# Patient Record
Sex: FEMALE | Race: WHITE | NOT HISPANIC OR LATINO | URBAN - METROPOLITAN AREA
[De-identification: names, ages, dates, MRNs, and addresses within clinical notes are randomized per-mention and may not be internally consistent; named-entity substitution may affect disease eponyms.]

---

## 2017-04-02 ENCOUNTER — COMMUNICATION - HEALTHEAST (OUTPATIENT)
Dept: FAMILY MEDICINE | Facility: CLINIC | Age: 42
End: 2017-04-02

## 2017-04-05 ENCOUNTER — COMMUNICATION - HEALTHEAST (OUTPATIENT)
Dept: FAMILY MEDICINE | Facility: CLINIC | Age: 42
End: 2017-04-05

## 2017-04-20 ENCOUNTER — COMMUNICATION - HEALTHEAST (OUTPATIENT)
Dept: TELEHEALTH | Facility: CLINIC | Age: 42
End: 2017-04-20

## 2017-09-13 ENCOUNTER — OFFICE VISIT - HEALTHEAST (OUTPATIENT)
Dept: FAMILY MEDICINE | Facility: CLINIC | Age: 42
End: 2017-09-13

## 2017-09-13 DIAGNOSIS — F33.9 MAJOR DEPRESSIVE DISORDER, RECURRENT EPISODE (H): ICD-10-CM

## 2017-09-13 DIAGNOSIS — M51.379 DEGENERATION OF LUMBAR OR LUMBOSACRAL INTERVERTEBRAL DISC: ICD-10-CM

## 2017-09-13 DIAGNOSIS — Z23 IMMUNIZATION DUE: ICD-10-CM

## 2018-03-08 ENCOUNTER — TELEPHONE (OUTPATIENT)
Dept: OTHER | Facility: CLINIC | Age: 43
End: 2018-03-08

## 2018-03-08 NOTE — TELEPHONE ENCOUNTER
3/8/2018    Call Regarding Onboarding [employer] [facility]     Attempt 1    Message on voicemail     Comments:       Outreach   SB

## 2018-08-30 ENCOUNTER — COMMUNICATION - HEALTHEAST (OUTPATIENT)
Dept: FAMILY MEDICINE | Facility: CLINIC | Age: 43
End: 2018-08-30

## 2018-08-30 DIAGNOSIS — F41.9 ANXIETY: ICD-10-CM

## 2018-10-19 ENCOUNTER — RECORDS - HEALTHEAST (OUTPATIENT)
Dept: ADMINISTRATIVE | Facility: OTHER | Age: 43
End: 2018-10-19

## 2018-10-19 LAB — PAP SMEAR - HIM PATIENT REPORTED: NORMAL

## 2018-11-12 ENCOUNTER — OFFICE VISIT - HEALTHEAST (OUTPATIENT)
Dept: FAMILY MEDICINE | Facility: CLINIC | Age: 43
End: 2018-11-12

## 2018-11-12 DIAGNOSIS — F33.0 MILD EPISODE OF RECURRENT MAJOR DEPRESSIVE DISORDER (H): ICD-10-CM

## 2018-11-12 DIAGNOSIS — M79.651 PAIN OF RIGHT THIGH: ICD-10-CM

## 2018-11-12 DIAGNOSIS — F41.9 ANXIETY: ICD-10-CM

## 2018-11-19 ENCOUNTER — HOSPITAL ENCOUNTER (OUTPATIENT)
Dept: MRI IMAGING | Facility: CLINIC | Age: 43
Discharge: HOME | End: 2018-11-19
Attending: FAMILY MEDICINE

## 2018-11-19 ENCOUNTER — COMMUNICATION - HEALTHEAST (OUTPATIENT)
Dept: FAMILY MEDICINE | Facility: CLINIC | Age: 43
End: 2018-11-19

## 2018-11-19 DIAGNOSIS — M79.651 PAIN OF RIGHT THIGH: ICD-10-CM

## 2019-02-11 ENCOUNTER — COMMUNICATION - HEALTHEAST (OUTPATIENT)
Dept: FAMILY MEDICINE | Facility: CLINIC | Age: 44
End: 2019-02-11

## 2019-02-12 ENCOUNTER — OFFICE VISIT - HEALTHEAST (OUTPATIENT)
Dept: FAMILY MEDICINE | Facility: CLINIC | Age: 44
End: 2019-02-12

## 2019-02-12 ENCOUNTER — COMMUNICATION - HEALTHEAST (OUTPATIENT)
Dept: FAMILY MEDICINE | Facility: CLINIC | Age: 44
End: 2019-02-12

## 2019-02-12 DIAGNOSIS — Z01.818 PREOPERATIVE EXAMINATION: ICD-10-CM

## 2019-02-12 LAB
ALBUMIN SERPL-MCNC: 3.9 G/DL (ref 3.5–5)
ALP SERPL-CCNC: 56 U/L (ref 45–120)
ALT SERPL W P-5'-P-CCNC: <9 U/L (ref 0–45)
ANION GAP SERPL CALCULATED.3IONS-SCNC: 7 MMOL/L (ref 5–18)
AST SERPL W P-5'-P-CCNC: 11 U/L (ref 0–40)
BASOPHILS # BLD AUTO: 0 THOU/UL (ref 0–0.2)
BASOPHILS NFR BLD AUTO: 0 % (ref 0–2)
BILIRUB SERPL-MCNC: 1.7 MG/DL (ref 0–1)
BUN SERPL-MCNC: 9 MG/DL (ref 8–22)
CALCIUM SERPL-MCNC: 9.1 MG/DL (ref 8.5–10.5)
CHLORIDE BLD-SCNC: 108 MMOL/L (ref 98–107)
CO2 SERPL-SCNC: 25 MMOL/L (ref 22–31)
CREAT SERPL-MCNC: 0.71 MG/DL (ref 0.6–1.1)
EOSINOPHIL # BLD AUTO: 0.1 THOU/UL (ref 0–0.4)
EOSINOPHIL NFR BLD AUTO: 1 % (ref 0–6)
ERYTHROCYTE [DISTWIDTH] IN BLOOD BY AUTOMATED COUNT: 10.6 % (ref 11–14.5)
GFR SERPL CREATININE-BSD FRML MDRD: >60 ML/MIN/1.73M2
GLUCOSE BLD-MCNC: 93 MG/DL (ref 70–125)
HCG UR QL: NEGATIVE
HCT VFR BLD AUTO: 38.7 % (ref 35–47)
HGB BLD-MCNC: 13.3 G/DL (ref 12–16)
LYMPHOCYTES # BLD AUTO: 1 THOU/UL (ref 0.8–4.4)
LYMPHOCYTES NFR BLD AUTO: 14 % (ref 20–40)
MCH RBC QN AUTO: 32.7 PG (ref 27–34)
MCHC RBC AUTO-ENTMCNC: 34.5 G/DL (ref 32–36)
MCV RBC AUTO: 95 FL (ref 80–100)
MONOCYTES # BLD AUTO: 0.4 THOU/UL (ref 0–0.9)
MONOCYTES NFR BLD AUTO: 6 % (ref 2–10)
NEUTROPHILS # BLD AUTO: 5.8 THOU/UL (ref 2–7.7)
NEUTROPHILS NFR BLD AUTO: 79 % (ref 50–70)
PLATELET # BLD AUTO: 235 THOU/UL (ref 140–440)
PMV BLD AUTO: 7.2 FL (ref 7–10)
POTASSIUM BLD-SCNC: 3.9 MMOL/L (ref 3.5–5)
PROT SERPL-MCNC: 6.8 G/DL (ref 6–8)
RBC # BLD AUTO: 4.07 MILL/UL (ref 3.8–5.4)
SODIUM SERPL-SCNC: 140 MMOL/L (ref 136–145)
SP GR UR STRIP: 1.02 (ref 1–1.03)
WBC: 7.4 THOU/UL (ref 4–11)

## 2019-02-12 ASSESSMENT — MIFFLIN-ST. JEOR: SCORE: 1237.36

## 2019-03-18 ENCOUNTER — RECORDS - HEALTHEAST (OUTPATIENT)
Dept: MAMMOGRAPHY | Facility: CLINIC | Age: 44
End: 2019-03-18

## 2019-03-18 DIAGNOSIS — Z12.31 ENCOUNTER FOR SCREENING MAMMOGRAM FOR MALIGNANT NEOPLASM OF BREAST: ICD-10-CM

## 2019-10-24 ENCOUNTER — AMBULATORY - HEALTHEAST (OUTPATIENT)
Dept: LAB | Facility: CLINIC | Age: 44
End: 2019-10-24

## 2019-10-24 DIAGNOSIS — L65.9 BALDNESS: ICD-10-CM

## 2019-10-24 LAB
FERRITIN SERPL-MCNC: 194 NG/ML (ref 10–130)
FOLATE SERPL-MCNC: 14.5 NG/ML
T4 FREE SERPL-MCNC: 1 NG/DL (ref 0.7–1.8)
TSH SERPL DL<=0.005 MIU/L-ACNC: 1.03 UIU/ML (ref 0.3–5)
VIT B12 SERPL-MCNC: <146 PG/ML (ref 213–816)

## 2019-10-25 LAB — 25(OH)D3 SERPL-MCNC: 22 NG/ML (ref 30–80)

## 2019-10-26 LAB
DHEA-S SERPL-MCNC: 100 UG/DL (ref 32–240)
ZINC SERPL-MCNC: 79.4 UG/DL (ref 60–120)

## 2019-10-27 LAB — ANDROST SERPL-MCNC: 0.55 NG/ML (ref 0.13–0.82)

## 2019-10-28 LAB — ANA SER QL: 0.3 U

## 2019-10-29 LAB
SHBG SERPL-SCNC: 133 NMOL/L (ref 30–135)
TESTOST FREE SERPL-MCNC: 0.09 NG/DL (ref 0.11–0.58)
TESTOST SERPL-MCNC: 21 NG/DL (ref 8–60)

## 2019-11-07 ENCOUNTER — OFFICE VISIT - HEALTHEAST (OUTPATIENT)
Dept: FAMILY MEDICINE | Facility: CLINIC | Age: 44
End: 2019-11-07

## 2019-11-07 DIAGNOSIS — Z13.0 SCREENING FOR DEFICIENCY ANEMIA: ICD-10-CM

## 2019-11-07 DIAGNOSIS — Z00.00 WELL FEMALE EXAM WITHOUT GYNECOLOGICAL EXAM: ICD-10-CM

## 2019-11-07 DIAGNOSIS — Z13.220 SCREENING FOR HYPERLIPIDEMIA: ICD-10-CM

## 2019-11-07 DIAGNOSIS — Z13.1 SCREENING FOR DIABETES MELLITUS: ICD-10-CM

## 2019-11-07 DIAGNOSIS — F41.9 ANXIETY: ICD-10-CM

## 2019-11-07 LAB
CHOLEST SERPL-MCNC: 140 MG/DL
FASTING STATUS PATIENT QL REPORTED: YES
FASTING STATUS PATIENT QL REPORTED: YES
GLUCOSE BLD-MCNC: 107 MG/DL (ref 70–99)
HDLC SERPL-MCNC: 38 MG/DL
HGB BLD-MCNC: 13.7 G/DL (ref 12–16)
LDLC SERPL CALC-MCNC: 86 MG/DL
TRIGL SERPL-MCNC: 80 MG/DL

## 2019-11-07 RX ORDER — CLOBETASOL PROPIONATE 0.5 MG/ML
SOLUTION TOPICAL
Refills: 2 | Status: SHIPPED | COMMUNITY
Start: 2019-10-23 | End: 2023-03-28

## 2019-11-07 ASSESSMENT — MIFFLIN-ST. JEOR: SCORE: 1232.82

## 2019-11-07 ASSESSMENT — PATIENT HEALTH QUESTIONNAIRE - PHQ9: SUM OF ALL RESPONSES TO PHQ QUESTIONS 1-9: 0

## 2019-11-21 ENCOUNTER — RECORDS - HEALTHEAST (OUTPATIENT)
Dept: HEALTH INFORMATION MANAGEMENT | Facility: CLINIC | Age: 44
End: 2019-11-21

## 2019-11-26 ENCOUNTER — OFFICE VISIT - HEALTHEAST (OUTPATIENT)
Dept: FAMILY MEDICINE | Facility: CLINIC | Age: 44
End: 2019-11-26

## 2019-11-26 DIAGNOSIS — E53.8 VITAMIN B12 DEFICIENCY (NON ANEMIC): ICD-10-CM

## 2019-11-26 ASSESSMENT — MIFFLIN-ST. JEOR: SCORE: 1246.43

## 2019-12-03 ENCOUNTER — AMBULATORY - HEALTHEAST (OUTPATIENT)
Dept: NURSING | Facility: CLINIC | Age: 44
End: 2019-12-03

## 2019-12-12 ENCOUNTER — AMBULATORY - HEALTHEAST (OUTPATIENT)
Dept: NURSING | Facility: CLINIC | Age: 44
End: 2019-12-12

## 2019-12-20 ENCOUNTER — AMBULATORY - HEALTHEAST (OUTPATIENT)
Dept: NURSING | Facility: CLINIC | Age: 44
End: 2019-12-20

## 2019-12-20 ENCOUNTER — COMMUNICATION - HEALTHEAST (OUTPATIENT)
Dept: FAMILY MEDICINE | Facility: CLINIC | Age: 44
End: 2019-12-20

## 2019-12-20 DIAGNOSIS — E55.9 VITAMIN D DEFICIENCY: ICD-10-CM

## 2019-12-20 DIAGNOSIS — E53.8 VITAMIN B12 DEFICIENCY (NON ANEMIC): ICD-10-CM

## 2019-12-23 ENCOUNTER — COMMUNICATION - HEALTHEAST (OUTPATIENT)
Dept: FAMILY MEDICINE | Facility: CLINIC | Age: 44
End: 2019-12-23

## 2020-01-13 ENCOUNTER — AMBULATORY - HEALTHEAST (OUTPATIENT)
Dept: LAB | Facility: CLINIC | Age: 45
End: 2020-01-13

## 2020-01-13 DIAGNOSIS — L64.8 OTHER ANDROGENIC ALOPECIA: ICD-10-CM

## 2020-01-13 DIAGNOSIS — E53.8 VITAMIN B12 DEFICIENCY (NON ANEMIC): ICD-10-CM

## 2020-01-13 DIAGNOSIS — E55.9 VITAMIN D DEFICIENCY: ICD-10-CM

## 2020-01-13 LAB
FERRITIN SERPL-MCNC: 191 NG/ML (ref 10–130)
VIT B12 SERPL-MCNC: 252 PG/ML (ref 213–816)

## 2020-01-14 ENCOUNTER — AMBULATORY - HEALTHEAST (OUTPATIENT)
Dept: FAMILY MEDICINE | Facility: CLINIC | Age: 45
End: 2020-01-14

## 2020-01-14 DIAGNOSIS — E55.9 VITAMIN D DEFICIENCY: ICD-10-CM

## 2020-01-14 DIAGNOSIS — E53.8 VITAMIN B12 DEFICIENCY (NON ANEMIC): ICD-10-CM

## 2020-01-14 LAB
25(OH)D3 SERPL-MCNC: 95 NG/ML (ref 30–80)
25(OH)D3 SERPL-MCNC: 95 NG/ML (ref 30–80)

## 2020-03-13 ENCOUNTER — RECORDS - HEALTHEAST (OUTPATIENT)
Dept: ADMINISTRATIVE | Facility: OTHER | Age: 45
End: 2020-03-13

## 2020-10-28 ENCOUNTER — COMMUNICATION - HEALTHEAST (OUTPATIENT)
Dept: FAMILY MEDICINE | Facility: CLINIC | Age: 45
End: 2020-10-28

## 2020-10-28 DIAGNOSIS — F41.9 ANXIETY: ICD-10-CM

## 2020-11-17 ENCOUNTER — RECORDS - HEALTHEAST (OUTPATIENT)
Dept: ADMINISTRATIVE | Facility: OTHER | Age: 45
End: 2020-11-17

## 2021-01-14 ENCOUNTER — RECORDS - HEALTHEAST (OUTPATIENT)
Dept: MAMMOGRAPHY | Facility: CLINIC | Age: 46
End: 2021-01-14

## 2021-01-14 DIAGNOSIS — Z12.31 ENCOUNTER FOR SCREENING MAMMOGRAM FOR MALIGNANT NEOPLASM OF BREAST: ICD-10-CM

## 2021-02-02 ENCOUNTER — OFFICE VISIT - HEALTHEAST (OUTPATIENT)
Dept: FAMILY MEDICINE | Facility: CLINIC | Age: 46
End: 2021-02-02

## 2021-02-02 DIAGNOSIS — F41.9 ANXIETY: ICD-10-CM

## 2021-02-02 DIAGNOSIS — Z00.00 ROUTINE GENERAL MEDICAL EXAMINATION AT A HEALTH CARE FACILITY: ICD-10-CM

## 2021-02-02 DIAGNOSIS — F33.0 MILD EPISODE OF RECURRENT MAJOR DEPRESSIVE DISORDER (H): ICD-10-CM

## 2021-02-02 RX ORDER — CITALOPRAM HYDROBROMIDE 10 MG/1
10 TABLET ORAL DAILY
Qty: 15 TABLET | Refills: 1 | Status: SHIPPED | OUTPATIENT
Start: 2021-02-02 | End: 2023-03-28

## 2021-02-02 ASSESSMENT — ANXIETY QUESTIONNAIRES
6. BECOMING EASILY ANNOYED OR IRRITABLE: NOT AT ALL
5. BEING SO RESTLESS THAT IT IS HARD TO SIT STILL: NOT AT ALL
7. FEELING AFRAID AS IF SOMETHING AWFUL MIGHT HAPPEN: NOT AT ALL
IF YOU CHECKED OFF ANY PROBLEMS ON THIS QUESTIONNAIRE, HOW DIFFICULT HAVE THESE PROBLEMS MADE IT FOR YOU TO DO YOUR WORK, TAKE CARE OF THINGS AT HOME, OR GET ALONG WITH OTHER PEOPLE: NOT DIFFICULT AT ALL
1. FEELING NERVOUS, ANXIOUS, OR ON EDGE: NOT AT ALL
GAD7 TOTAL SCORE: 3
3. WORRYING TOO MUCH ABOUT DIFFERENT THINGS: NOT AT ALL
2. NOT BEING ABLE TO STOP OR CONTROL WORRYING: NOT AT ALL
4. TROUBLE RELAXING: NEARLY EVERY DAY

## 2021-02-02 ASSESSMENT — PATIENT HEALTH QUESTIONNAIRE - PHQ9: SUM OF ALL RESPONSES TO PHQ QUESTIONS 1-9: 6

## 2021-02-02 ASSESSMENT — MIFFLIN-ST. JEOR: SCORE: 1282.72

## 2021-02-25 ENCOUNTER — OFFICE VISIT - HEALTHEAST (OUTPATIENT)
Dept: FAMILY MEDICINE | Facility: CLINIC | Age: 46
End: 2021-02-25

## 2021-02-25 DIAGNOSIS — F33.0 MILD EPISODE OF RECURRENT MAJOR DEPRESSIVE DISORDER (H): ICD-10-CM

## 2021-02-25 ASSESSMENT — ANXIETY QUESTIONNAIRES
6. BECOMING EASILY ANNOYED OR IRRITABLE: NOT AT ALL
GAD7 TOTAL SCORE: 1
7. FEELING AFRAID AS IF SOMETHING AWFUL MIGHT HAPPEN: NOT AT ALL
5. BEING SO RESTLESS THAT IT IS HARD TO SIT STILL: NOT AT ALL
3. WORRYING TOO MUCH ABOUT DIFFERENT THINGS: NOT AT ALL
1. FEELING NERVOUS, ANXIOUS, OR ON EDGE: NOT AT ALL
IF YOU CHECKED OFF ANY PROBLEMS ON THIS QUESTIONNAIRE, HOW DIFFICULT HAVE THESE PROBLEMS MADE IT FOR YOU TO DO YOUR WORK, TAKE CARE OF THINGS AT HOME, OR GET ALONG WITH OTHER PEOPLE: NOT DIFFICULT AT ALL
2. NOT BEING ABLE TO STOP OR CONTROL WORRYING: NOT AT ALL
4. TROUBLE RELAXING: SEVERAL DAYS

## 2021-02-25 ASSESSMENT — PATIENT HEALTH QUESTIONNAIRE - PHQ9: SUM OF ALL RESPONSES TO PHQ QUESTIONS 1-9: 1

## 2021-02-26 ENCOUNTER — COMMUNICATION - HEALTHEAST (OUTPATIENT)
Dept: FAMILY MEDICINE | Facility: CLINIC | Age: 46
End: 2021-02-26

## 2021-02-26 DIAGNOSIS — F33.0 MILD EPISODE OF RECURRENT MAJOR DEPRESSIVE DISORDER (H): ICD-10-CM

## 2021-02-26 RX ORDER — BUPROPION HYDROCHLORIDE 75 MG/1
75 TABLET ORAL 2 TIMES DAILY
Qty: 270 TABLET | Refills: 3 | Status: SHIPPED | OUTPATIENT
Start: 2021-02-26 | End: 2022-04-06

## 2021-03-05 ENCOUNTER — COMMUNICATION - HEALTHEAST (OUTPATIENT)
Dept: FAMILY MEDICINE | Facility: CLINIC | Age: 46
End: 2021-03-05

## 2021-05-26 ASSESSMENT — PATIENT HEALTH QUESTIONNAIRE - PHQ9: SUM OF ALL RESPONSES TO PHQ QUESTIONS 1-9: 0

## 2021-05-27 ASSESSMENT — PATIENT HEALTH QUESTIONNAIRE - PHQ9
SUM OF ALL RESPONSES TO PHQ QUESTIONS 1-9: 6
SUM OF ALL RESPONSES TO PHQ QUESTIONS 1-9: 1

## 2021-05-28 ASSESSMENT — ANXIETY QUESTIONNAIRES
GAD7 TOTAL SCORE: 1
GAD7 TOTAL SCORE: 3

## 2021-05-31 ENCOUNTER — RECORDS - HEALTHEAST (OUTPATIENT)
Dept: ADMINISTRATIVE | Facility: CLINIC | Age: 46
End: 2021-05-31

## 2021-05-31 VITALS — WEIGHT: 135 LBS | BODY MASS INDEX: 23.17 KG/M2

## 2021-06-02 VITALS — WEIGHT: 135 LBS | BODY MASS INDEX: 23.05 KG/M2 | HEIGHT: 64 IN

## 2021-06-03 VITALS
HEIGHT: 64 IN | BODY MASS INDEX: 23.39 KG/M2 | DIASTOLIC BLOOD PRESSURE: 70 MMHG | WEIGHT: 137 LBS | SYSTOLIC BLOOD PRESSURE: 106 MMHG | HEART RATE: 60 BPM

## 2021-06-03 VITALS
DIASTOLIC BLOOD PRESSURE: 68 MMHG | BODY MASS INDEX: 22.88 KG/M2 | HEART RATE: 68 BPM | HEIGHT: 64 IN | SYSTOLIC BLOOD PRESSURE: 92 MMHG | WEIGHT: 134 LBS | RESPIRATION RATE: 16 BRPM

## 2021-06-03 NOTE — PROGRESS NOTES
Assessment/ Plan:         1. Vitamin B12 deficiency (non anemic)  cyanocobalamin injection 1,000 mcg     Patient has a vitamin B12 deficiency with labs that were checked by Dr. Ghotra before hair and skin changes.  Will start injections of cyanocobalamin 1000mcq weekly for 4 weeks. Plan recheck level in about 5 weeks. If at normal level would recommend starting oral B12 supplement. If level does not remain normal then would recommend injection every 1-2 months. She is in agreement with this plan.        Subjective:      Mya Isbell is a 44 y.o. female who presents for low B12 level recently checked by dermatologist. Would like to start on B12 injections. Main symptom she has had is hair loss which is why she initiated care with dermatology. Hair loss is thought to be from vitamin defienciy. [place] 53729 iu of D2 for 12 weeks.    She was advised by her dermatologist to be seen for her B12 deficiency and start on injections to get her level normal.  She denies any other questions or concerns today.  In general she is otherwise feeling well.    B12 level checked by Dr. Ghotra was <146.    The following portions of the patient's history were reviewed and updated as appropriate: allergies, current medications and problem list.    Patient Active Problem List   Diagnosis     Lumbar Disc Degeneration     Eczema     Major Depression, Recurrent     Vitamin D Deficiency     Tinea Corporis     Pain of right thigh       Current Outpatient Medications   Medication Sig     cholecalciferol, vitamin D3, (VITAMIN D3 ORAL) Take 1 tablet by mouth daily.     citalopram (CELEXA) 10 MG tablet Take 1 tablet (10 mg total) by mouth daily.     clobetasol (TEMOVATE) 0.05 % external solution      levonorgestrel-ethinyl estradiol (AVIANE,ALESSE,LESSINA) 0.1-20 mg-mcg per tablet Take 1 tablet by mouth daily.     MULTIVITAMIN ORAL Take 1 tablet by mouth daily.     cholecalciferol, vitamin D3, 50,000 unit capsule        Review of  "Systems   A 12 point comprehensive review of systems was negative except as noted.      Objective:      /70   Pulse 60   Ht 5' 4\" (1.626 m)   Wt 137 lb (62.1 kg)   LMP 10/31/2019   BMI 23.52 kg/m      General appearance: alert, appears stated age and cooperative  Head: Normocephalic, without obvious abnormality, atraumatic  Lungs: clear to auscultation bilaterally  Heart: regular rate and rhythm, S1, S2 normal, no murmur, click, rub or gallop  Skin: Skin color, texture, turgor normal. No rashes or lesions. Hair thinning noted on scalp.   Neurologic: Grossly normal        Jennifer Calderon CNP  8:22 AM  "

## 2021-06-03 NOTE — PROGRESS NOTES
Assessment/ Plan:      1. Well female exam without gynecological exam     2. Anxiety  citalopram (CELEXA) 10 MG tablet   3. Screening for hyperlipidemia  Lipid [place] FASTING   4. Screening for diabetes mellitus  Glucose   5. Screening for deficiency anemia  Hemoglobin     Healthy female exam completed today. Discussed lifestyle modifications including weight management, eating a balanced diet and getting regular cardiovascular exercise. Discussed self breast exams and how to complete these. Pap Smear updated with her OB/GYN, records requested. Hx of anxiety, well managed on 10mg celexa. No side effects. Discussed reducing dose if she felt like she wanted to try. Recommend 5mg. May also want to consider an IUD and stop the OCP. Briefly reviewed labs that were completed with Brandin. It does appear that she has B12 deficiency. If brandin wants PCP to manage I would put in orders.     Plan yearly annual physicals or sooner as needed.        Subjective:      Mya Isbell is a 44 y.o. female who presents for an annual exam. The patient is sexually active. The patient participates in regular exercise: yes. The patient reports that there is not domestic violence in her life. Needs refills of her medications. Sees an OB/GYN for her breast and pelvic exams.  Follows with dermatology for skin and hair changes.  Recent labs were completed.  Did show some B12 deficiency and a low testosterone.    Healthy Habits:   Regular Exercise: Yes  Sunscreen Use: Yes  Healthy Diet: Yes  Dental Visits Regularly: Yes  Seat Belt: Yes  Sexually active: Yes  Self Breast Exam Monthly:Yes  Hemoccults: No  Flex Sig: Yes  Colonoscopy: Yes  Lipid Profile: Yes  Glucose Screen: Yes  Prevention of Osteoporosis: Yes  Last Dexa: No  Guns at Home:  No      Immunization History   Administered Date(s) Administered     Hep A, Adult IM (19yr & older) 01/11/2012, 09/11/2012     Hep A, historic 01/11/2012, 09/11/2012     Hep B, Adult 11/20/2000,  2001     Influenza,seasonal,quad inj =/> 6months 2017     Td, Adult, Absorbed 2017     Tdap 2007     Immunization status: up to date and documented.    No exam data present    Gynecologic History  Patient's last menstrual period was 10/31/2019.  Contraception: OCP (estrogen/progesterone) and oral progesterone-only contraceptive  Last Pap:10/2017. Results were: normal  Last mammogram: 3/18/19. Results were: normal      OB History    Para Term  AB Living       0         SAB TAB Ectopic Multiple Live Births                   Current Outpatient Medications   Medication Sig Dispense Refill     cholecalciferol, vitamin D3, (VITAMIN D3 ORAL) Take 1 tablet by mouth daily.       citalopram (CELEXA) 10 MG tablet Take 1 tablet (10 mg total) by mouth daily. 90 tablet 3     levonorgestrel-ethinyl estradiol (AVIANE,ALESSE,LESSINA) 0.1-20 mg-mcg per tablet Take 1 tablet by mouth daily.       MULTIVITAMIN ORAL Take 1 tablet by mouth daily.       clobetasol (TEMOVATE) 0.05 % external solution   2     doxycycline (MONODOX) 100 MG capsule Take 100 mg by mouth 2 (two) times a day.       No current facility-administered medications for this visit.      No past medical history on file.  No past surgical history on file.  Patient has no known allergies.  Family History   Problem Relation Age of Onset     Depression Mother      Diabetes type II Mother      Prostate cancer Father      Depression Sister      Anxiety disorder Sister      Depression Brother      Social History     Socioeconomic History     Marital status: Single     Spouse name: Not on file     Number of children: Not on file     Years of education: Not on file     Highest education level: Not on file   Occupational History     Not on file   Social Needs     Financial resource strain: Not on file     Food insecurity:     Worry: Not on file     Inability: Not on file     Transportation needs:     Medical: Not on file     Non-medical: Not on  "file   Tobacco Use     Smoking status: Never Smoker     Smokeless tobacco: Never Used   Substance and Sexual Activity     Alcohol use: Yes     Frequency: Monthly or less     Drinks per session: 1 or 2     Binge frequency: Never     Comment: 1x month     Drug use: No     Sexual activity: Yes     Partners: Male     Birth control/protection: Pill   Lifestyle     Physical activity:     Days per week: Not on file     Minutes per session: Not on file     Stress: Not on file   Relationships     Social connections:     Talks on phone: Not on file     Gets together: Not on file     Attends [employer] service: Not on file     Active member of club or organization: Not on file     Attends meetings of clubs or organizations: Not on file     Relationship status: Not on file     Intimate partner violence:     Fear of current or ex partner: Not on file     Emotionally abused: Not on file     Physically abused: Not on file     Forced sexual activity: Not on file   Other Topics Concern     Not on file   Social History Narrative    Works as a Professor at [employer].        Carlton James MD  2/12/2019           Review of Systems  General:  Denies problem  Eyes: Denies problem  Ears/Nose/Throat: Denies problem  Cardiovascular: Denies problem  Respiratory:  Denies problem  Gastrointestinal:  Denies problem, Genitourinary: Denies problem  Musculoskeletal:  Denies problem  Skin: Denies problem  Neurologic: Denies problem  Psychiatric: Denies problem  Endocrine: Denies problem  Heme/Lymphatic: Denies problem   Allergic/Immunologic: Denies problem        Objective:         Vitals:    11/07/19 0756   BP: 92/68   Pulse: 68   Resp: 16   Weight: 134 lb (60.8 kg)   Height: 5' 4\" (1.626 m)     Body mass index is 23 kg/m .    Physical Exam:  General Appearance: Alert, cooperative, no distress, appears stated age  Head: Normocephalic, without obvious abnormality, atraumatic  Eyes: PERRL, conjunctiva/corneas clear, EOM's " intact  Ears: Normal TM's and external ear canals, both ears  Nose: Nares normal, septum midline,mucosa normal, no drainage  Throat: Lips, mucosa, and tongue normal; teeth and gums normal  Neck: Supple, symmetrical, trachea midline, no adenopathy;  thyroid: not enlarged, symmetric, no tenderness/mass/nodules; no carotid bruit or JVD  Back: Symmetric, no curvature, ROM normal, no CVA tenderness  Lungs: Clear to auscultation bilaterally, respirations unlabored  Breasts: No breast masses, tenderness, asymmetry, or nipple discharge.  Heart: Regular rate and rhythm, S1 and S2 normal, no murmur, rub, or gallop, Abdomen: Soft, non-tender, bowel sounds active all four quadrants,  no masses, no organomegaly  Pelvic:Not examined  Extremities: Extremities normal, atraumatic, no cyanosis or edema  Skin: Skin color, texture, turgor normal, no rashes or lesions  Lymph nodes: Cervical, supraclavicular, and axillary nodes normal  Neurologic: Normal

## 2021-06-04 NOTE — TELEPHONE ENCOUNTER
Pended the lab draw for 5 weeks from her appointment with you also called pt left a detailed VM and told her when to follow up she will also fax over some other labs her dermatologist ordered so I left our fax and scheduling numbers  Will send 3G Multimedia message aswell

## 2021-06-05 VITALS
DIASTOLIC BLOOD PRESSURE: 60 MMHG | OXYGEN SATURATION: 99 % | BODY MASS INDEX: 24.75 KG/M2 | SYSTOLIC BLOOD PRESSURE: 112 MMHG | HEIGHT: 64 IN | WEIGHT: 145 LBS | HEART RATE: 95 BPM

## 2021-06-12 NOTE — TELEPHONE ENCOUNTER
Refill Approved    Rx renewed per Medication Renewal Policy. Medication was last renewed on 11/7/19.    Pearl Mcarthur, [facility] Connection Triage/Med Refill 10/29/2020     Requested Prescriptions   Pending Prescriptions Disp Refills     citalopram (CELEXA) 10 MG tablet 90 tablet 3     Sig: Take 1 tablet (10 mg total) by mouth daily.       SSRI Refill Protocol  Passed - 10/28/2020  1:22 PM        Passed - PCP or prescribing provider visit in last year     Last office visit with prescriber/PCP: 11/26/2019 Jennifer Calderon CNP OR same dept: 11/26/2019 Jennifer Calderon CNP OR same specialty: 11/26/2019 Jennifer Calderon CNP  Last physical: 11/7/2019 Last MTM visit: Visit date not found   Next visit within 3 mo: Visit date not found  Next physical within 3 mo: Visit date not found  Prescriber OR PCP: Jennifer Calderon CNP  Last diagnosis associated with med order: 1. Anxiety  - citalopram (CELEXA) 10 MG tablet; Take 1 tablet (10 mg total) by mouth daily.  Dispense: 90 tablet; Refill: 3    If protocol passes may refill for 12 months if within 3 months of last provider visit (or a total of 15 months).

## 2021-06-12 NOTE — PROGRESS NOTES
ASSESSMENT/PLAN  1. Major depressive disorder, recurrent episode  Stable with Celexa/citalopram at 10 mg.  Continue with this and follow-up annually.  Prescription is updated.    2. Lumbar Disc Degeneration  Med list updated to discontinue tramadol and cyclobenzaprine which is been given to patient with flare of back pain.  She states she has been stable with routine exercise.  Follow-up as needed.    3. Immunization due  Tetanus and seasonal flu shot administered today.    I have had an Advance Directives discussion with the patient.  Patient given copy of honoring choices and encouraged to complete it.  I have asked her share copy with us once completed.    Pt states an understanding and agrees to the above plan.          SUBJECTIVE:   Chief Complaint   Patient presents with     Medication Management     Discuss medications     Mya TEJEDA Sukhi 42 y.o. female    Current Outpatient Prescriptions   Medication Sig Dispense Refill     citalopram (CELEXA) 10 MG tablet Take 1 tablet (10 mg total) by mouth daily. 90 tablet 3     levonorgestrel-ethinyl estradiol (AVIANE,ALESSE,LESSINA) 0.1-20 mg-mcg per tablet Take 1 tablet by mouth daily.       MULTIVITAMIN ORAL Take 1 tablet by mouth daily.       CALCIUM CARBONATE (CALCIUM 500 ORAL) Take 1 tablet by mouth daily. chewable       No current facility-administered medications for this visit.      Allergies: Review of patient's allergies indicates no known allergies.   Patient's last menstrual period was 09/04/2017 (exact date).    HPI:   Mya is a 42-year-old female in today for follow-up regarding depression.  Patient has been on citalopram since 2008.  She is doing well with the medication no side effects or concerns.  She has tried getting off of a couple times with some minimal side effects.  Given significant family history for depression, she opts to continue with her that she is doing well.  She does request refill today.  Patient follows with OB/GYN,   Corrie.  She has scheduled appointment for her routine GYN care.  She intends to discuss options for birth control she has been having longer menstrual cycles on her current birth control pill.  She denies any hot flashes or significant mood changes.  Patient remains sexually active.  Despite patient's weight being appropriate for height, she is frustrated with 10 pound weight gain over the last year.  She had been previously closer to 130.  She notes that despite routine exercise including some strength training, she is finding she is gaining more weight around her midsection.  She has no family history for thyroid disorder and no recent check of thyroid function.  She plans to have this done with Dr. Denton in conjunction with routine labs including glucose and cholesterol.  Reviewed problem list including degenerative disc disease.  Patient states this is stable.  She did have some tramadol and cyclobenzaprine for flare but only needed one tablet each.  She maintains back health with routine exercise at this point.  Medication list reviewed and reconciled, family history reviewed and updated.  All health care maintenance reviewed: Reviewed immunizations and recommend patient updating her tetanus as well as getting seasonal flu shot.  She consents to both.  Mammogram and Pap done through OB/GYN.  Patient will make sure they forward copies to us.    ROS: negative except as per HPI    OBJECTIVE:   The patient appears well, alert, oriented x 3, in no distress.  /74  Pulse 66  Temp 98.3  F (36.8  C) (Oral)   Resp 16  Wt 135 lb (61.2 kg)  LMP 09/04/2017 (Exact Date)  BMI 23.17 kg/m2    15 minutes spent face-to-face with patient, greater than 50% of this in counseling regarding the above, making plans for follow-up care, and medication management.    PHQ9 = 0

## 2021-06-14 NOTE — PATIENT INSTRUCTIONS - HE
Try tapering citalopram    Start bupropion - 75 mg two times a day - second dose should not be after 2 pm.  Stay on this does for at least 2 weeks, then you can incraese morning dose 150 mg and stay at 75 mg for the afternoon dose

## 2021-06-14 NOTE — PROGRESS NOTES
"FEMALE PREVENTATIVE EXAM    Assessment and Plan:     Patient has been advised of split billing requirements and indicates understanding: Yes    1. Routine general medical examination at a health care facility  Generally well woman    2. Mild episode of recurrent major depressive disorder (H)/3. Anxiety  Not responding to her usual citalopram - feeling \"jose d\" - trial of tapering citalopram and starting bupropion.    - depression action plan sent  - buPROPion (WELLBUTRIN) 75 MG tablet; Take 1 tablet (75 mg total) by mouth 2 (two) times a day. May increase morning dose to 150 mg after 2 weeks  Dispense: 60 tablet; Refill: 1  We could also consider keeping citalopram if anxiety gets worse. Discussed side effects      Patient Instructions   Try tapering citalopram    Start bupropion - 75 mg two times a day - second dose should not be after 2 pm.  Stay on this does for at least 2 weeks, then you can incraese morning dose 150 mg and stay at 75 mg for the afternoon dose        Next follow up:  Return in about 4 weeks (around 3/2/2021) for follow up. - can be virtual    Immunization Review  Adult Imm Review: Due today, orders placed  influenza    I discussed the following with the patient:   SSRIs, SNRIs, others and side effects    I have had an Advance Directives discussion with the patient.   Has paperwork at home    Subjective:   Chief Complaint: Mya Isbell is an 45 y.o. female here for a preventative health visit.    Patient has been advised of split billing requirements and indicates understanding: Yes  HPI:  Lissett had previously been seen at [facility] which had closed as of the beginning of December 2020.  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  She has been working at home since last March, the beginning of the pandemic. The is  with no children and they have an adorable mutt at home who keeps them in " "the habit of going for walks.    Lissett has a history of anxiety and depression and has been seeing a therapist for 15 years.  She had been on medication for 12 years, mostly Citalopram which she has continued - she notes that Paxil gave her \"electric brain shocks.\"  Just a week ago her depression started to \"wave\" at her again.  She doesn't feels down as much as \"jose d.\" Work is really dominant in her life, but working remotely is uninspiring, as was her trail of yoga on line.     She does keep up with friends, and she and her  have set aside some time to have dates at home    She notes low libido is not a new issue, and she thinks this pre-dated when she started citalopram     History of basal cell cancer - sees dermatology    Preventive   - she sees ob-gyn for pap smears and has an appt soon   - she had a mammogram 2-3 weeks ago   - takes Vitamin D 50k weekly for low D      Healthy Habits  Are you taking a daily aspirin? No  Do you typically exercising at least 40 min, 3-4 times per week?  Yes  Do you usually eat at least 4 servings of fruit and vegetables a day, include whole grains and fiber and avoid regularly eating high fat foods? NO  Have you had an eye exam in the past two years? Yes  Do you see a dentist twice per year? Yes  Do you have any concerns regarding sleep? No    Safety Screen  If you own firearms, are they secured in a locked gun cabinet or with trigger locks? The patient does not own any firearms  Do you feel you are safe where you are living?: Yes (2/2/2021  3:10 PM)  Do you feel you are safe in your relationship(s)?: Yes (2/2/2021  3:10 PM)      Review of Systems:    Constitutional: negative for recent illness or change in weight  Eyes: negative for irritation and vision change  Ears, nose, mouth, throat, and face: negative for nasal congestion and sore throat  Respiratory: negative for cough and dyspnea on exertion  Cardiovascular: negative for chest pain and " "palpitations  Gastrointestinal: negative for abdominal pain and change in bowel habits  Genitourinary:negative for dysuria, frequency and hesitancy  Integument/breast: negative for rash  Hematologic/lymphatic: negative for bleeding and easy bruising  Musculoskeletal:negative for arthralgias and myalgias  Neurological: negative for dizziness, headaches and paresthesia      Cancer Screening       Status Date      PAP SMEAR Next Due 10/19/2021      Done 10/19/2018 PAP SMEAR EXTERNAL RESULT     Patient has more history with this topic...    MAMMOGRAM Next Due 1/14/2022      Done 1/14/2021 MAMMO SCREENING BILATERAL     Patient has more history with this topic...              History     Reviewed By Date/Time Sections Reviewed    Sandra Cosme MD 2/2/2021  4:05 PM Family    Sandra Cosme MD 2/2/2021  4:03 PM Surgical    Sandra Cosme MD 2/2/2021  4:02 PM Medical    Carin Clements CMA 2/2/2021  3:10 PM Tobacco            Objective:   Vital Signs:   Visit Vitals  /60   Pulse 95   Ht 5' 4\" (1.626 m)   Wt 145 lb (65.8 kg)   SpO2 99%   BMI 24.89 kg/m           PHYSICAL EXAM  General appearance - alert, well appearing, and in no distress  Mental status - normal mood, behavior, speech, dress, motor activity, and thought processes  Eyes - pupils equal and reactive, extraocular eye movements intact, funduscopic exam normal, discs flat and sharp  Ears - bilateral TM's and external ear canals normal  Mouth - mucous membranes moist, pharynx normal without lesions  Neck - supple, no significant adenopathy, carotids upstroke normal bilaterally, no bruits, thyroid exam: thyroid is normal in size without nodules or tenderness  Chest - clear to auscultation, no wheezes, rales or rhonchi, symmetric air entry  Heart - normal rate, regular rhythm, normal S1, S2, no murmurs, rubs, clicks or gallops  Abdomen - soft, nontender, nondistended, no masses or organomegaly  Breasts - breasts appear normal, no suspicious masses, no skin or " nipple changes or axillary nodes  Neurological - alert, oriented, normal speech, no focal findings or movement disorder noted, DTR's normal and symmetric  Extremities - peripheral pulses normal, no pedal edema, no clubbing or cyanosis  Skin - no rashes or worrisome lesions

## 2021-06-15 NOTE — TELEPHONE ENCOUNTER
Refill Approved    Rx renewed per Medication Renewal Policy. Medication was last renewed on 2/2/21.    Kong Shelley, [facility] Connection Triage/Med Refill 2/26/2021     Requested Prescriptions   Pending Prescriptions Disp Refills     buPROPion (WELLBUTRIN) 75 MG tablet [Pharmacy Med Name: BUPROPION HCL 75 MG TABLET] 60 tablet 1     Sig: TAKE 1 TABLET (75 MG TOTAL) BY MOUTH 2 (TWO) TIMES A DAY. MAY INCREASE MORNING DOSE  MG AFTER 2 WEEKS       Tricyclics/Misc Antidepressant/Antianxiety Meds Refill Protocol Passed - 2/26/2021  8:31 AM        Passed - PCP or prescribing provider visit in last year     Last office visit with prescriber/PCP: Visit date not found OR same dept: Visit date not found OR same specialty: 11/26/2019 Jennifer Calderon, CNP  Last physical: 2/2/2021 Last MTM visit: Visit date not found   Next visit within 3 mo: Visit date not found  Next physical within 3 mo: Visit date not found  Prescriber OR PCP: Sandra Cosme MD  Last diagnosis associated with med order: 1. Mild episode of recurrent major depressive disorder (H)  - buPROPion (WELLBUTRIN) 75 MG tablet [Pharmacy Med Name: BUPROPION HCL 75 MG TABLET]; Take 1 tablet (75 mg total) by mouth 2 (two) times a day. May increase morning dose to 150 mg after 2 weeks  Dispense: 60 tablet; Refill: 1    If protocol passes may refill for 12 months if within 3 months of last provider visit (or a total of 15 months).

## 2021-06-15 NOTE — PROGRESS NOTES
"Mya Isbell is a 45 y.o. female who is being evaluated via a billable video visit.      How would you like to obtain your AVS? MyChart.  If dropped from the video visit, the video invitation should be resent by: Send to e-mail at: [email or web address]  Will anyone else be joining your video visit? No    Video Start Time: 5:10      Subjective   Mya Isbell is 45 y.o. 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  She has been working at home since last March, the beginning of the pandemic. The is  with no children and they have an adorable mutt at home who keeps them in the habit of going for walks.     Lissett has a history of anxiety and depression and has been seeing a therapist for 15 years.  She had been on medication for 12 years, mostly Citalopram which she has continued - she notes that Paxil gave her \"electric brain shocks.\"  Just a week ago her depression started to \"wave\" at her again.  She doesn't feels down as much as \"jose d.\" Work is really dominant in her life, but working remotely is uninspiring, as was her trail of yoga on line.      She does keep up with friends, and she and her  have set aside some time to have dates at home     She notes low libido is not a new issue, and she thinks this pre-dated when she started citalopram     Mild episode of recurrent major depressive disorder (H)/3. Anxiety  Not responding to her usual citalopram - feeling \"jose d\" - trial of tapering citalopram and starting bupropion.    - depression action plan sent  - buPROPion (WELLBUTRIN) 75 MG tablet; Take 1 tablet (75 mg total) by mouth 2 (two) times a day. May increase morning dose to 150 mg after 2 weeks  Dispense: 60 tablet; Refill: 1  We could also consider keeping citalopram if anxiety gets worse. Discussed side effects      ---  TODAY: Lissett says she " feels better than 2 weeks ago - she is currently on citalopram every other day  - done Sunday.  She has had no problems sleeping  And no jitteriness after starting bupropion. The only symptom she notes is a little heartburn in the afternoon.  She is taking 75 mg two times a day    The gaby weather has also been a nice boost. She still feels her mood could be improved a little more    Little interest or pleasure in doing things: Not at all  Feeling down, depressed, or hopeless: Several days  Trouble falling or staying asleep, or sleeping too much: Not at all  Feeling tired or having little energy: Not at all  Poor appetite or overeating: Not at all  Feeling bad about yourself - or that you are a failure or have let yourself or your family down: Not at all  Trouble concentrating on things, such as reading the newspaper or watching television: Not at all  Moving or speaking so slowly that other people could have noticed. Or the opposite - being so fidgety or restless that you have been moving around a lot more than usual: Not at all  Thoughts that you would be better off dead, or of hurting yourself in some way: Not at all  PHQ-9 Total Score: 1  If you checked off any problems, how difficult have these problems made it for you to do your work, take care of things at home, or get along with other people?: Not difficult at all    Review of Systems  See body of HPI      Objective       Vitals:  No vitals were obtained today due to virtual visit.    Physical Exam  She appears well and in     General appearance - alert, well appearing, and in no distress  Mental status - normal mood, behavior, speech, dress, motor activity, and thought processes  Chest - breathing well and without labor  Neurological - alert, oriented, normal speech, no focal findings or movement disorder noted              Video-Visit Details    Type of service:  Video Visit    Video End Time (time video stopped): 5:17 PM  Originating Location (pt.  "Location): Home    Distant Location (provider location):  [facility]     Platform used for Video Visit: Jazmyn     Assessment and Plan    1. Mild episode of recurrent major depressive disorder (H)  She successfully tapered citalopram and has started bupropion- feels this is helping, but mood could still be improved. Discussed if still not \"there in 2 weeks, my increase to 2 pills in the am, and one in th afternoon - she will let me know by mychart    Return in about 6 months (around 8/25/2021) for or earlier as needed, if symptoms are not improving.    Sandra Cosme MD      "

## 2021-06-16 PROBLEM — M79.651 PAIN OF RIGHT THIGH: Status: ACTIVE | Noted: 2018-11-12

## 2021-06-21 NOTE — LETTER
Letter by Sandra Cosme MD at      Author: Sandra Cosme MD Service: -- Author Type: --    Filed:  Encounter Date: 2/2/2021 Status: (Other)                    My Depression Action Plan  Name: Mya Isbell   Date of Birth 1975  Date: 2/2/2021    My Doctor: Jennifer Calderon CNP   My Clinic: [facility]  [address] [id number] [phone]          GREEN    ZONE   Good Control    What it looks like:     Things are going generally well. You have normal ups and downs. You may even feel depressed from time to time, but bad moods usually last less than a day.   What you need to do:  1. Continue to care for yourself (see self care plan)  2. Check your depression survival kit and update it as needed  3. Follow your physicians recommendations including any medication.  4. Do not stop taking medication unless you consult with your physician first.           YELLOW         ZONE Getting Worse    What it looks like:     Depression is starting to interfere with your life.     It may be hard to get out of bed; you may be starting to isolate yourself from others.    Symptoms of depression are starting to last most all day and this has happened for several days.     You may have suicidal thoughts but they are not constant.   What you need to do:     1. Call your care team. Your response to treatment will improve if you keep your care team informed of your progress. Yellow periods are signs an adjustment may need to be made.     2. Continue your self-care.  Just get dressed and ready for the day.  Don't give yourself time to talk yourself out of it.    3. Talk to someone in your support network.    4. Open up your depression Depression Self-Care Plan / Wellness kit.           RED    ZONE Medical Alert - Get Help    What it looks like:     Depression is seriously interfering with your life.     You may experience these or other symptoms: You cant get out of bed  most days, cant work or engage in other necessary activities, you have trouble taking care of basic hygiene, or basic responsibilities, thoughts of suicide or death that will not go away, self-injurious behavior.     What you need to do:  1. Call your care team and request a same-day appointment. If they are not available (weekends or after hours) call your local crisis line, emergency room or 911.          Depression Self-Care Plan / Wellness Kit    Many people find that medication and therapy are helpful treatments for managing depression. In addition, making small changes to your everyday life can help to boost your mood and improve your wellbeing. Below are some tips for you to consider. Be sure to talk with your medical provider and/or behavioral health consultant if your symptoms are worsening or not improving.     Sleep  Sleep hygiene means all of the habits that support good, restful sleep. It includes maintaining a consistent bedtime and wake time, using your bedroom only for sleeping or sex, and keeping the bedroom dark and free of distractions like a computer, smartphone, or television.     Develop a Healthy Routine  Maintain good hygiene. Get out of bed in the morning, make your bed, brush your teeth, take a shower, and get dressed. Dont spend too much time viewing media that makes you feel stressed. Find time to relax each day.    Exercise  Get some form of exercise every day. This will help reduce pain and release endorphins, the feel good chemicals in your brain. It can be as simple as just going for a walk or doing some gardening, anything that will get you moving.      Diet  Strive to eat healthy foods, including fruits and vegetables. Drink plenty of water. Avoid excessive sugar, caffeine, alcohol, and other mood-altering substances.     Stay Connected with Others  Stay in touch with friends and family members.    Manage Your Mood  Try deep breathing, massage therapy, biofeedback, or meditation.  Take part in fun activities when you can. Try to find something to smile about each day.     Psychotherapy  Be open to working with a therapist if your provider recommends it.     Medication  Be sure to take your medication as prescribed. Most anti-depressants need to be taken every day. It usually takes several weeks for medications to work. Not all medicines work for all people. It is important to follow-up with your provider to make sure you have a treatment plan that is working for you. Do not stop your medication abruptly without first discussing it with your provider.    Crisis Resources   These hotlines are for both adults and children. They and are open 24 hours a day, 7 days a week unless noted otherwise.      National Suicide Prevention Lifeline   2-022-774-TALK (8909)      Crisis Text Line    www.crisistextline.org  Text HOME to 723656 from anywhere in the [place], anytime, about any type of crisis. A live, trained crisis counselor will receive the text and respond quickly.      [facility] Lifeline for LGBTQ Youth  A national crisis intervention and suicide lifeline for LGBTQ youth under 25. Provides a safe place to talk without judgement. Call 1-431.165.5332; text START to 736146 or visit www.theLymbixvorproject.org to talk to a trained counselor.      For [facility] crisis numbers, visit the [facility] website at:  https://[place].gov/dhs/people-we-serve/adults/health-care/mental-health/resources/crisis-contacts.jsp

## 2021-06-21 NOTE — PROGRESS NOTES
ASSESSMENT/PLAN  1. Pain of right thigh  Pain is quite atypical and has now become a chronic issue.  Given it is quite atypical for patient to come in/complain, this elevates my level of concern.  Patient would very much like to pursue further evaluation and so we discussed role for possible MRI.  She would like to pursue this.  Referral completed and patient will be provided with results when available.    - MR Femur Without Contrast Right; Future    2. Mild episode of recurrent major depressive disorder (H)  Patient currently on 10 mg of citalopram and doing well with this.  She elects to continue with current medication.  But we discussed weaning but she would like to avoid that during the winter months--she states she might consider it in the spring.  With medication refill provided.    - citalopram (CELEXA) 10 MG tablet; Take 1 tablet (10 mg total) by mouth daily.  Dispense: 90 tablet; Refill: 3    Health maintenance is reviewed:  Patient to [facility] from [facility] to be sent including her most recent Pap smear and any lab work that has been completed.  Last mammogram on our records is from 9/18.  Patient's immunizations reviewed and up-to-date.  She reports getting this year's seasonal flu shot at outside clinic.    Pt states an understanding and agrees to the above plan.  Return in about 1 year (around 11/12/2019) for depression or anxiety, medication check.            SUBJECTIVE:   Chief Complaint   Patient presents with     Pain     3-4 months right anterior upper leg pain felt mostly at night     Mya Isbell 43 y.o. female    Current Outpatient Medications   Medication Sig Dispense Refill     cholecalciferol, vitamin D3, (VITAMIN D3 ORAL) Take 1 tablet by mouth daily.       citalopram (CELEXA) 10 MG tablet Take 1 tablet (10 mg total) by mouth daily. 90 tablet 3     levonorgestrel-ethinyl estradiol (AVIANE,ALESSE,LESSINA) 0.1-20 mg-mcg per tablet Take 1 tablet by mouth daily.        "MULTIVITAMIN ORAL Take 1 tablet by mouth daily.       CALCIUM CARBONATE (CALCIUM 500 ORAL) Take 1 tablet by mouth daily. chewable       No current facility-administered medications for this visit.      Allergies: Patient has no known allergies.   No LMP recorded.    HPI:   Mya is a 43-year-old female who is generally healthy with past medical history significant for some underlying depression.  She does routine women's care/physical with Dr. Denton at [facility] (chart reviewed--no recent records from them).  Dr Denton prescribes her oral contraceptive.    Today, patient presents with complaint of pain in her right thigh.  Patient describes it as\" deep pain--feels like it is in the bone\".  Pain has been present for approximately 3-4 months at this time.  She states it has been more bothersome in the last couple months.  She has no history of any injury or trauma.  She is noted no skin changes.  It is unilateral--there are no symptoms on the left.  She has no previous history of similar issues.  Patient notes the pain more in the evening.  It is not to the point where she has been taking any over-the-counter analgesics.  It is not impacting sleep.  During the day patient is aware of pain in the thigh, but she is not noticing any impact on activity.  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    Patient's only prescription medication from this office is her citalopram at 10 mg.  She reports being stable on this dose and has no desire to make any changes.  She reports compliance with the medication and no side effects.  Patient would like a refill of her medication today.    ROS: negative except as per HPI    OBJECTIVE:   The patient appears well, alert, oriented x 3, in no distress.  /74 (Patient Site: Left Arm, Patient Position: Sitting, Cuff Size: Adult Regular)   Pulse 81   Temp 98.4  F (36.9  C) (Oral)   Resp 14   SpO2 95%   PHQ 9 = 0    Lower " extremity examined.  No asymmetry in the right thigh compared to left.  No reproducible tenderness on deep palpation along the quadriceps musculature.  Patient points to lateral right thigh two thirds of the way down above her knee as being the area of tenderness but is not reproducible on exam.  Skin without changes.  Strength testing against resistance excellent and does not reproduce pain.  Gait is observed as normal.

## 2021-06-23 NOTE — TELEPHONE ENCOUNTER
New Appointment Needed  What is the reason for the visit:    Pre-Op Appt Request  When is the surgery? :  02.13.19  Where is the surgery?:  [facility]   Who is the surgeon? :  Dr. Bal  What type of surgery is being done?: Repairing left cheek.  Provider Preference: PCP but willing to see anybody else.   How soon do you need to be seen?: tomorrow  Waitlist offered?: No  Okay to leave a detailed message:  Yes

## 2021-06-23 NOTE — TELEPHONE ENCOUNTER
Who is calling:  Patient  Reason for Call:  Patient called in stating she scheduled appointment online for her pre op today with her doctor. However that is no a pre op appointment it is a normal appointment. Can the patient keep this appointment at 3:20 for the pre op as she has surgery tomorrow? Please advise.

## 2021-06-24 NOTE — TELEPHONE ENCOUNTER
Reason contacted:  Pre-op appt clarification  Information relayed:  Per pt pre-op appt is scheduled 2/12/19 at 10:30 am and had just needed the 3:20 office visit appt canceled.  Additional questions:  No  Further follow-up needed:  No

## 2021-06-24 NOTE — PROGRESS NOTES
Preoperative Exam    Scheduled Procedure: Repai of Mohs defect left cheek with rotation flap  Surgery Date:  2/13/2019  Surgery Location: [facility] [place]    Surgeon:  Ching Bal    Assessment/Plan:     1. Preoperative examination  Pregnancy, Urine    HM1(CBC and Differential)    Comprehensive Metabolic Panel    HM1 (CBC with Diff)         Surgical Procedure Risk: Low (reported cardiac risk generally < 1%)  Have you had prior anesthesia?: Yes  Have you or any family members had a previous anesthesia reaction:  No  Do you or any family members have a history of a clotting or bleeding disorder?: No  Cardiac Risk Assessment: no increased risk for major cardiac complications    Patient approved for surgery with general or local anesthesia.    Please Note:  Patient is on Celexa and Birthcontrol pills   There is no benefit to stopping this medication a day before surgery.  Discussed with the patient about risk of thromboembolism with BCP and high risk procedure with prolonged n.p.o. status.  The surgery is going to be same day.    Functional Status: Independent  Patient plans to recover at home with family.     Subjective:      Chief Complaint   Patient presents with     Pre-op Exam     DOS 2/13/2019,  Repai of mohs defect left chek with rotation flap, with Dr. Ching Bal, @ [facility] in [facility].       Mya Isbell is a 43 y.o. female who presents for a preoperative consultation.    No fevers.  No URI  BM are normal.    All other systems reviewed and are negative, other than those listed in the HPI.    Pertinent History  Do you have difficulty breathing or chest pain after walking up a flight of stairs: No  History of obstructive sleep apnea: No  Steroid use in the last 6 months: No  Frequent Aspirin/NSAID use: No  Prior Blood Transfusion: No  Prior Blood Transfusion Reaction: No  If for some reason prior to, during or after the procedure, if it is medically  indicated, would you be willing to have a blood transfusion?:  There is no transfusion refusal.    Current Outpatient Medications   Medication Sig Dispense Refill     cholecalciferol, vitamin D3, (VITAMIN D3 ORAL) Take 1 tablet by mouth daily.       citalopram (CELEXA) 10 MG tablet Take 1 tablet (10 mg total) by mouth daily. 90 tablet 3     doxycycline (MONODOX) 100 MG capsule Take 100 mg by mouth 2 (two) times a day.       levonorgestrel-ethinyl estradiol (AVIANE,ALESSE,LESSINA) 0.1-20 mg-mcg per tablet Take 1 tablet by mouth daily.       MULTIVITAMIN ORAL Take 1 tablet by mouth daily.       No current facility-administered medications for this visit.         No Known Allergies    Patient Active Problem List   Diagnosis     Lumbar Disc Degeneration     Eczema     Major Depression, Recurrent     Vitamin D Deficiency     Tinea Corporis     Pain of right thigh       History reviewed. No pertinent past medical history.    History reviewed. No pertinent surgical history.    Social History     Socioeconomic History     Marital status: Single     Spouse name: Not on file     Number of children: Not on file     Years of education: Not on file     Highest education level: Not on file   Social Needs     Financial resource strain: Not on file     Food insecurity - worry: Not on file     Food insecurity - inability: Not on file     Transportation needs - medical: Not on file     Transportation needs - non-medical: Not on file   Occupational History     Not on file   Tobacco Use     Smoking status: Never Smoker     Smokeless tobacco: Never Used   Substance and Sexual Activity     Alcohol use: Yes     Frequency: Monthly or less     Drinks per session: 1 or 2     Binge frequency: Never     Drug use: No     Sexual activity: Yes     Partners: Male     Birth control/protection: Pill   Other Topics Concern     Not on file   Social History Em[place]    Works as a Professor at [employer].        Carlton James MD   "2/12/2019           Patient Care Team:  Minna Newell MD as PCP - General  Kimberlyn Denton MD as Physician (Obstetrics and Gynecology)          Objective:     Vitals:    02/12/19 1025   BP: 113/82   Pulse: 93   Resp: 18   SpO2: 97%   Weight: 135 lb (61.2 kg)   Height: 5' 4\" (1.626 m)         Physical Exam:  GENERAL APPEARANCE:  Appearing stated age, smiling, alert, cooperative, and in no acute distress.   HEENT: Pupils equal, regular, react to light and accommodation. Extraocular muscles intact, fundi benign. Ear canals and tympanic membranes are normal. Lips, mouth, and throat are unremarkable.   NECK: Neck supple without adenopathy, thyromegaly or masses.   LYMPH: No anterior cervical or supraclavicular LN enlargement   PULMONARY: Normal respiratory effort. Chest is clear.   CARDIOVASCULAR: Heart auscultation: rhythm regular, heart sounds normal    SKIN: Warm and well perfused..   ABDOMEN: Abdomen soft, non-tender. BS normal. No masses or organomegaly..   EXTREMITIES: Peripheral pulses are full. Extremities with no edema.   MENTAL STATUS: Alert, oriented and thought content appropriate   NEUROLOGIC: Station and gait normal        Patient Instructions     Hold all supplements, aspirin and NSAIDs for 7 days prior to surgery.    Follow your surgeon's direction on when to stop eating and drinking prior to surgery.    Your surgeon will be managing your pain after your surgery.      Remove all jewelry and metal piercings before your surgery.     Remove nail polish from fingers before surgery.    Carlton James MD  2/12/2019            EKG:  Not needed.    Labs:  Recent Results (from the past 24 hour(s))   Pregnancy, Urine    Collection Time: 02/12/19 11:08 AM   Result Value Ref Range    Pregnancy Test, Urine Negative Negative    Specific Gravity, UA 1.020 1.001 - 1.030   HM1 (CBC with Diff)    Collection Time: 02/12/19 11:08 AM   Result Value Ref Range    WBC 7.4 4.0 - 11.0 thou/uL    RBC 4.07 3.80 - 5.40 " mill/uL    Hemoglobin 13.3 12.0 - 16.0 g/dL    Hematocrit 38.7 35.0 - 47.0 %    MCV 95 80 - 100 fL    MCH 32.7 27.0 - 34.0 pg    MCHC 34.5 32.0 - 36.0 g/dL    RDW 10.6 (L) 11.0 - 14.5 %    Platelets 235 140 - 440 thou/uL    MPV 7.2 7.0 - 10.0 fL    Neutrophils % 79 (H) 50 - 70 %    Lymphocytes % 14 (L) 20 - 40 %    Monocytes % 6 2 - 10 %    Eosinophils % 1 0 - 6 %    Basophils % 0 0 - 2 %    Neutrophils Absolute 5.8 2.0 - 7.7 thou/uL    Lymphocytes Absolute 1.0 0.8 - 4.4 thou/uL    Monocytes Absolute 0.4 0.0 - 0.9 thou/uL    Eosinophils Absolute 0.1 0.0 - 0.4 thou/uL    Basophils Absolute 0.0 0.0 - 0.2 thou/uL       Immunization History   Administered Date(s) Administered     Hep A, Adult IM (19yr & older) 01/11/2012, 09/11/2012     Hep A, historic 01/11/2012, 09/11/2012     Hep B, Adult 11/20/2000, 03/01/2001     Influenza, seasonal,quad inj 36+ mos 09/13/2017     Td, Adult, Absorbed 09/13/2017     Tdap 06/01/2007           Electronically signed by Carlton James MD 02/12/19 10:28 AM

## 2021-06-24 NOTE — PATIENT INSTRUCTIONS - HE
Hold all supplements, aspirin and NSAIDs for 7 days prior to surgery.    Follow your surgeon's direction on when to stop eating and drinking prior to surgery.    Your surgeon will be managing your pain after your surgery.      Remove all jewelry and metal piercings before your surgery.     Remove nail polish from fingers before surgery.    Carlton James MD  2/12/2019

## 2021-06-27 ENCOUNTER — HEALTH MAINTENANCE LETTER (OUTPATIENT)
Age: 46
End: 2021-06-27

## 2021-08-23 ENCOUNTER — TRANSFERRED RECORDS (OUTPATIENT)
Dept: HEALTH INFORMATION MANAGEMENT | Facility: CLINIC | Age: 46
End: 2021-08-23

## 2021-10-17 ENCOUNTER — HEALTH MAINTENANCE LETTER (OUTPATIENT)
Age: 46
End: 2021-10-17

## 2022-03-25 ENCOUNTER — TRANSFERRED RECORDS (OUTPATIENT)
Dept: HEALTH INFORMATION MANAGEMENT | Facility: CLINIC | Age: 47
End: 2022-03-25
Payer: COMMERCIAL

## 2022-04-02 ENCOUNTER — HEALTH MAINTENANCE LETTER (OUTPATIENT)
Age: 47
End: 2022-04-02

## 2022-04-05 DIAGNOSIS — F33.0 MILD EPISODE OF RECURRENT MAJOR DEPRESSIVE DISORDER (H): ICD-10-CM

## 2022-04-06 RX ORDER — BUPROPION HYDROCHLORIDE 75 MG/1
TABLET ORAL
Qty: 180 TABLET | Refills: 5 | Status: SHIPPED | OUTPATIENT
Start: 2022-04-06

## 2022-04-06 NOTE — TELEPHONE ENCOUNTER
"Former patient of [facility] & has not established care with another provider.  Please assign refill request to covering provider per clinic standard process.    Routing refill request to provider for review/approval because:  PHQ 9 score - not on file/out of date.  Patient needs to be seen because it has been more than 6 months since last office visit.  No PCP    Last Written Prescription Date:  2/26/21  Last Fill Quantity: 270,  # refills: 3   Last office visit provider:  2/25/21     Requested Prescriptions   Pending Prescriptions Disp Refills     buPROPion (WELLBUTRIN) 75 MG tablet [Pharmacy Med Name: BUPROPION HCL 75 MG TABLET] 180 tablet 5     Sig: TAKE 1 TAB BY MOUTH 2 TIMES A DAY. MAY INCREASE MORNING DOSE  MG AFTER 2 WEEKS       SSRIs Protocol Failed - 4/6/2022  9:38 AM        Failed - PHQ-9 score less than 5 in past 6 months     Please review last PHQ-9 score.           Failed - Recent (6 mo) or future (30 days) visit within the authorizing provider's specialty     Patient had office visit in the last 6 months or has a visit in the next 30 days with authorizing provider or within the authorizing provider's specialty.  See \"Patient Info\" tab in inbasket, or \"Choose Columns\" in Meds & Orders section of the refill encounter.            Passed - Medication is Bupropion     If the medication is Bupropion (Wellbutrin), and the patient is taking for smoking cessation; OK to refill.          Passed - Medication is active on med list        Passed - Patient is age 18 or older        Passed - No active pregnancy on record        Passed - No positive pregnancy test in last 12 months             Kong Shelley RN 04/06/22 9:38 AM  "

## 2022-04-28 ENCOUNTER — ANCILLARY PROCEDURE (OUTPATIENT)
Dept: MAMMOGRAPHY | Facility: CLINIC | Age: 47
End: 2022-04-28
Attending: NURSE PRACTITIONER
Payer: COMMERCIAL

## 2022-04-28 DIAGNOSIS — Z12.31 VISIT FOR SCREENING MAMMOGRAM: ICD-10-CM

## 2022-04-28 PROCEDURE — 77067 SCR MAMMO BI INCL CAD: CPT

## 2022-05-28 ENCOUNTER — HEALTH MAINTENANCE LETTER (OUTPATIENT)
Age: 47
End: 2022-05-28

## 2022-06-13 ENCOUNTER — TELEPHONE (OUTPATIENT)
Dept: FAMILY MEDICINE | Facility: CLINIC | Age: 47
End: 2022-06-13
Payer: COMMERCIAL

## 2022-06-13 NOTE — TELEPHONE ENCOUNTER
This patient was transferred in ERROR to the INR/Anticoagulation clinic. She is looking to schedule or find out information on Genetic Counseling.    Please call her back.  Thank you.  Ladi Vallejo RN  Anticoagulation Nurse - [facility]

## 2022-06-22 ENCOUNTER — VIRTUAL VISIT (OUTPATIENT)
Dept: INTERNAL MEDICINE | Facility: CLINIC | Age: 47
End: 2022-06-22
Payer: COMMERCIAL

## 2022-06-22 DIAGNOSIS — Z84.81 FHX: BRCA GENE POSITIVE: Primary | ICD-10-CM

## 2022-06-22 PROCEDURE — 99203 OFFICE O/P NEW LOW 30 MIN: CPT | Mod: 95 | Performed by: INTERNAL MEDICINE

## 2022-06-22 ASSESSMENT — PATIENT HEALTH QUESTIONNAIRE - PHQ9
SUM OF ALL RESPONSES TO PHQ QUESTIONS 1-9: 0
SUM OF ALL RESPONSES TO PHQ QUESTIONS 1-9: 0

## 2022-06-22 NOTE — PROGRESS NOTES
"Lissett is a 46 year old who is being evaluated via a billable video visit.      How would you like to obtain your AVS? MyChart  If the video visit is dropped, the invitation should be resent by: Text to cell phone: [phone]  Will anyone else be joining your video visit? No          Assessment & Plan     (Z84.81) FHx: BRCA gene positive  (primary encounter diagnosis)  Comment: paternal uncle  Plan: Cancer Risk Mgmt/Cancer Genetic Counseling         Referral        She would like a referral. Placed.                    No follow-ups on file.    Walter Mejía MD  [facility]    Subjective   Lissett is a 46 year old, presenting for the following health issues:  OTHER (Referral to genetic counselor )      HPI   Would like to see a genetic counselor. Would like a referral  Paternal uncle that was tested and subsequently found to have BRCA 2 \"variant\"  That uncle has advanced prostate cancer. Patient's father also has prostate cancer. He has not wanted to get testing himself. She is worried about potential impact on her health if she carries this gene as well.          Review of Systems         Objective           Vitals:  No vitals were obtained today due to virtual visit.    Physical Exam   GENERAL: Healthy, alert and no distress  EYES: Eyes grossly normal to inspection.  No discharge or erythema, or obvious scleral/conjunctival abnormalities.  RESP: No audible wheeze, cough, or visible cyanosis.  No visible retractions or increased work of breathing.    SKIN: Visible skin clear. No significant rash, abnormal pigmentation or lesions.  NEURO: Cranial nerves grossly intact.  Mentation and speech appropriate for age.  PSYCH: Mentation appears normal, affect normal/bright, judgement and insight intact, normal speech and appearance well-groomed.                Video-Visit Details    Video Start Time: 4:52 PM    Type of service:  Video Visit    Video End Time:4:58 PM    Originating Location " (pt. Location): Home    Distant Location (provider location):  [facility]     Platform used for Video Visit: [employer]  ..  Answers for HPI/ROS submitted by the patient on 6/22/2022  PHQ9 TOTAL SCORE: 0  What is the reason for your visit today? : referral request for genetic counseling  How many servings of fruits and vegetables do you eat daily?: 2-3  On average, how many sweetened beverages do you drink each day (Examples: soda, juice, sweet tea, etc.  Do NOT count diet or artificially sweetened beverages)?: 0  How many minutes a day do you exercise enough to make your heart beat faster?: 60 or more  How many days a week do you exercise enough to make your heart beat faster?: 5  How many days per week do you miss taking your medication?: 0

## 2022-06-23 ENCOUNTER — PATIENT OUTREACH (OUTPATIENT)
Dept: ONCOLOGY | Facility: CLINIC | Age: 47
End: 2022-06-23

## 2022-09-12 ENCOUNTER — VIRTUAL VISIT (OUTPATIENT)
Dept: ONCOLOGY | Facility: CLINIC | Age: 47
End: 2022-09-12
Attending: INTERNAL MEDICINE
Payer: COMMERCIAL

## 2022-09-12 DIAGNOSIS — Z80.42 FAMILY HISTORY OF PROSTATE CANCER: ICD-10-CM

## 2022-09-12 DIAGNOSIS — Z84.81 FHX: BRCA GENE POSITIVE: Primary | ICD-10-CM

## 2022-09-12 DIAGNOSIS — Z80.3 FAMILY HISTORY OF MALIGNANT NEOPLASM OF BREAST: ICD-10-CM

## 2022-09-12 PROCEDURE — 96040 HC GENETIC COUNSELING, EACH 30 MINUTES: CPT | Mod: GT,95 | Performed by: GENETIC COUNSELOR, MS

## 2022-09-12 NOTE — PROGRESS NOTES
Lissett is a 47 year old who is being evaluated via a billable video visit.      Patient confirms medications and allergies are accurate via patients echeck in completion, and or denies any changes since last reviewed/verified.     Laura Johnson, Virtual Facilitator    How would you like to obtain your AVS? MyChart  If the video visit is dropped, the invitation should be resent by: Text to cell phone: [phone]  Will anyone else be joining your video visit? No    Video-Visit Details    Video Start Time: 2:15 pm    Type of service:  Video Visit    Video End Time:3:00 pm    Originating Location (pt. Location): Home    Distant Location (provider location):  [facility]     Platform used for Video Visit: [employer]     9/12/2022    Referring Provider: Walter Mejía MD    Presenting Information:   I spoke with Mya Isbell over video today for genetic counseling to discuss her family history of cancer and a familial BRCA2 mutation. With her permission, this appointment was conducted virtually due to COVID-19 precautions. We talked today to review this history, cancer screening recommendations, and available genetic testing options.    Personal History:  Mya is a 47 year old female. She has a history of basal cell carcinoma in 2018 or 2019, treated with Mohs. She reports that she has regular skin exams with dermatology.     She had her first menstrual period at age 16 and is premenopausal. She does not have any children. Mya has her ovaries, fallopian tubes and uterus in place. She reports that she has not used hormone replacement therapy. She has used oral contraceptives for 25 years. She has clinical breast exams and mammograms; her most recent mammogram on 4/28/22 was negative. Mya has not had a colonoscopy. Mya reported no history of tobacco use and rare alcohol use.    Family History: (Please see scanned pedigree for detailed family history  information)  Maternal:    Her mother is 74 years old with no known history of cancer.     She has a cousin who is 42 years old and was diagnosed with stage 4 breast cancer about 2.5 years ago. She has reportedly undergone genetic testing that was negative (unknown which genes were tested).     Her grandfather was diagnosed with bladder cancer in his 70s. He had no known history of smoking or exposures. He was a heavy drinker.  Paternal:    Her father is 76 years old and has a history of prostate cancer at age 67. Treatment included surgery only. He has declined genetic testing.     Her uncle was diagnosed with metastatic prostate cancer at age 81. He recently passed away at age 84. He underwent genetic testing in December 2021 and tested positive for a BRCA2 mutation called [id number] (also known as p.[id number]). Testing was performed at [facility]. A copy of this result was provided for review today.    Another uncle was diagnosed with throat cancer and passed away at an unknown age. He had a history of smoking.    Mya also has three aunts who are all alive, although she has limited information about them. She does not know if any of them have had cancer.     She has no information about her cousins.     Her ethnicity is Mongolian, Hungarian, Polish, other (unknown which is maternal and which is paternal). There is no known Ashkenazi Episcopal ancestry on either side of her family.     Discussion:    We reviewed the features of sporadic, familial, and hereditary cancers. There is a known BRCA2 mutation that has been identified in her paternal uncle. We reviewed that Mya's father has a 50% chance to have inherited this same mutation. We reviewed that if her father does have this mutation, then there would be a 50% chance for Mya and her siblings to have inherited the mutation. If he does not have the mutation, then he could not have passed it on. As he has declined genetic testing, genetic  testing for Mya is warranted.     We discussed the natural history and genetics of hereditary cancer. We discussed the BRCA2 gene. Mutations in this gene cause a condition known as Hereditary Breast and Ovarian Cancer syndrome (HBOC). Women with a mutation in this gene are at increased risk for breast and ovarian cancer. There is also an increased risk for a second primary breast cancer. Men with a mutation in this gene are at increased risk for breast and prostate cancer. Both women and men may also be at increased risk for pancreatic cancer and melanoma. A detailed handout regarding the BRCA1 and BRCA2 genes and other genes in which mutations are associated with an increased risk for breast cancer will be provided to Mya via [email or web address] and can be found in the after visit summary. Topics included: inheritance pattern, cancer risks, cancer screening recommendations, and also risks, benefits and limitations of testing.      Based on her personal and family history, Mya meets current National Comprehensive Cancer Network (NCCN) criteria for genetic testing of the BRCA2 gene.      We discussed that there are additional genes that could cause increased risk for prostate, breast, and other cancers. As many of these genes present with overlapping features in a family and accurate cancer risk cannot always be established based upon the pedigree analysis alone, it would be reasonable for Mya to consider panel genetic testing to analyze multiple genes at once.    We reviewed genetic testing options for Mya based on her personal and family history: testing for the familial BRCA2 mutation only, or testing of a panel of genes associated with an increased risk for multiple different cancer types. She expressed an interest in more broad testing and opted for the CancerNext Panel.  Genetic testing is available for 36 genes associated with hereditary cancer: CancerNext (APC, PADMA, AXIN2, BARD1, BMPR1A,  BRCA1, BRCA2, BRIP1, CDH1, CDK4, CDKN2A, CHEK2, DICER1, EPCAM, GREM1, HOXB13, MLH1, MSH2, MSH3, MSH6, MUTYH, NBN, NF1, NTHL1, PALB2, PMS2, POLD1, POLE, PTEN, RAD51C, RAD51D, RECQL, SMAD4, SMARCA4, STK11, and TP53).  We discussed that many of the genes in the CancerNext panel are associated with specific hereditary cancer syndromes and published management guidelines: Hereditary Breast and Ovarian Cancer syndrome (BRCA1, BRCA2), Arevalo syndrome (MLH1, MSH2, MSH6, PMS2, EPCAM), Familial Adenomatous Polyposis (APC), Hereditary Diffuse Gastric Cancer (CDH1), Familial Atypical Multiple Mole Melanoma syndrome (CDK4, CDKN2A), Juvenile Polyposis syndrome (BMPR1A, SMAD4), Cowden syndrome (PTEN), Li Fraumeni syndrome (TP53), Peutz-Jeghers syndrome (STK11), MUTYH Associated Polyposis (MUTYH), and Neurofibromatosis type 1 (NF1).   The PADMA, AXIN2, BRIP1, CHEK2, GREM1, MSH3, NBN, NTHL1, PALB2, POLD1, POLE, RAD51C, and RAD51D genes are associated with increased cancer risk and have published management guidelines for certain cancers.    The remaining genes (BARD1, DICER1, HOXB13, RECQL, and SMARCA4) are associated with increased cancer risk and may allow us to make medical recommendations when mutations are identified.      Due to COVID-19 precautions consent was obtained over the phone/video today. Genetic testing via the CancerNext Panel will be sent to [facility]. Mya opted to have a saliva sample collection kit shipped directly to her home from [facility]. She will ship the kit back to [facility] for analysis. Turnaround time from date when sample is received at the lab: approximately 3-4 weeks.    Medical Management: For Mya, we reviewed that the information from genetic testing may determine:    additional cancer screening for which Mya may qualify (i.e. mammogram and breast MRI, more frequent colonoscopies, more frequent dermatologic exams, etc.),    options for risk reducing surgeries  Mya could consider (i.e. bilateral mastectomy, surgery to remove her ovaries and/or uterus, etc.),      and targeted chemotherapies if she were to develop certain cancers in the future (i.e. immunotherapy for individuals with Arevalo syndrome, PARP inhibitors, etc.).     These recommendations will be discussed in detail once genetic testing is completed.     Plan:  1) Today Mya elected to proceed with genetic testing via the CancerNext Panel offered by [facility].  2) This information should be available in 4-5 weeks.  3) Mya will be scheduled for a virtual visit (phone or video) to discuss the results.    Ana Mike MS, [facility]  Licensed, Certified Genetic Counselor  Office: [phone]  Email: [email or web address]

## 2022-09-12 NOTE — LETTER
Cancer Risk Management  Program Locations    [facility]  [facility]  [facility]  [facility]  Mailing Address  Cancer Risk Management Program  [facility]  [address]  [place] [address]    New patient appointments  [phone]  September 13, 2022    Mya Isbell  [address] [place] [address]      Dear Mya,    It was a pleasure speaking with you over video for genetic counseling on 9/12/2022. Here is a copy of the progress note from our discussion. If you have any additional questions, please feel free to call.    Referring Provider: Walter Mejía MD    Presenting Information:   I spoke with Mya Isbell over video today for genetic counseling to discuss her family history of cancer and a familial BRCA2 mutation. With her permission, this appointment was conducted virtually due to COVID-19 precautions. We talked today to review this history, cancer screening recommendations, and available genetic testing options.    Personal History:  Mya is a 47 year old female. She has a history of basal cell carcinoma in 2018 or 2019, treated with Mohs. She reports that she has regular skin exams with dermatology.     She had her first menstrual period at age 16 and is premenopausal. She does not have any children. Mya has her ovaries, fallopian tubes and uterus in place. She reports that she has not used hormone replacement therapy. She has used oral contraceptives for 25 years. She has clinical breast exams and mammograms; her most recent mammogram on 4/28/22 was negative. Mya has not had a colonoscopy. Mya reported no history of tobacco use and rare alcohol use.    Family History: (Please see scanned pedigree for detailed family history information)  Maternal:    Her mother is 74 years old with no  known history of cancer.     She has a cousin who is 42 years old and was diagnosed with stage 4 breast cancer about 2.5 years ago. She has reportedly undergone genetic testing that was negative (unknown which genes were tested).     Her grandfather was diagnosed with bladder cancer in his 70s. He had no known history of smoking or exposures. He was a heavy drinker.  Paternal:    Her father is 76 years old and has a history of prostate cancer at age 67. Treatment included surgery only. He has declined genetic testing.     Her uncle was diagnosed with metastatic prostate cancer at age 81. He recently passed away at age 84. He underwent genetic testing in December 2021 and tested positive for a BRCA2 mutation called [id number] (also known as p.[id number]*4). Testing was performed at [facility]. A copy of this result was provided for review today.    Another uncle was diagnosed with throat cancer and passed away at an unknown age. He had a history of smoking.    Mya also has three aunts who are all alive, although she has limited information about them. She does not know if any of them have had cancer.     She has no information about her cousins.     Her ethnicity is Yoruba, Cymraes, Polish, other (unknown which is maternal and which is paternal). There is no known Ashkenazi Hinduism ancestry on either side of her family.     Discussion:    We reviewed the features of sporadic, familial, and hereditary cancers. There is a known BRCA2 mutation that has been identified in her paternal uncle. We reviewed that Mya's father has a 50% chance to have inherited this same mutation. We reviewed that if her father does have this mutation, then there would be a 50% chance for Mya and her siblings to have inherited the mutation. If he does not have the mutation, then he could not have passed it on. As he has declined genetic testing, genetic testing for Mya is warranted.     We discussed the  natural history and genetics of hereditary cancer. We discussed the BRCA2 gene. Mutations in this gene cause a condition known as Hereditary Breast and Ovarian Cancer syndrome (HBOC). Women with a mutation in this gene are at increased risk for breast and ovarian cancer. There is also an increased risk for a second primary breast cancer. Men with a mutation in this gene are at increased risk for breast and prostate cancer. Both women and men may also be at increased risk for pancreatic cancer and melanoma. A detailed handout regarding the BRCA1 and BRCA2 genes and other genes in which mutations are associated with an increased risk for breast cancer will be provided to Mya via [employer] and can be found in the after visit summary. Topics included: inheritance pattern, cancer risks, cancer screening recommendations, and also risks, benefits and limitations of testing.      Based on her personal and family history, Mya meets current National Comprehensive Cancer Network (NCCN) criteria for genetic testing of the BRCA2 gene.      We discussed that there are additional genes that could cause increased risk for prostate, breast, and other cancers. As many of these genes present with overlapping features in a family and accurate cancer risk cannot always be established based upon the pedigree analysis alone, it would be reasonable for Mya to consider panel genetic testing to analyze multiple genes at once.    We reviewed genetic testing options for Mya based on her personal and family history: testing for the familial BRCA2 mutation only, or testing of a panel of genes associated with an increased risk for multiple different cancer types. She expressed an interest in more broad testing and opted for the CancerNext Panel.  Genetic testing is available for 36 genes associated with hereditary cancer: CancerNext (APC, PADMA, AXIN2, BARD1, BMPR1A, BRCA1, BRCA2, BRIP1, CDH1, CDK4, CDKN2A, CHEK2, DICER1,  EPCAM, GREM1, HOXB13, MLH1, MSH2, MSH3, MSH6, MUTYH, NBN, NF1, NTHL1, PALB2, PMS2, POLD1, POLE, PTEN, RAD51C, RAD51D, RECQL, SMAD4, SMARCA4, STK11, and TP53).  We discussed that many of the genes in the CancerNext panel are associated with specific hereditary cancer syndromes and published management guidelines: Hereditary Breast and Ovarian Cancer syndrome (BRCA1, BRCA2), Arevalo syndrome (MLH1, MSH2, MSH6, PMS2, EPCAM), Familial Adenomatous Polyposis (APC), Hereditary Diffuse Gastric Cancer (CDH1), Familial Atypical Multiple Mole Melanoma syndrome (CDK4, CDKN2A), Juvenile Polyposis syndrome (BMPR1A, SMAD4), Cowden syndrome (PTEN), Li Fraumeni syndrome (TP53), Peutz-Jeghers syndrome (STK11), MUTYH Associated Polyposis (MUTYH), and Neurofibromatosis type 1 (NF1).   The PADMA, AXIN2, BRIP1, CHEK2, GREM1, MSH3, NBN, NTHL1, PALB2, POLD1, POLE, RAD51C, and RAD51D genes are associated with increased cancer risk and have published management guidelines for certain cancers.    The remaining genes (BARD1, DICER1, HOXB13, RECQL, and SMARCA4) are associated with increased cancer risk and may allow us to make medical recommendations when mutations are identified.      Due to COVID-19 precautions consent was obtained over the phone/video today. Genetic testing via the CancerNext Panel will be sent to [facility]. Mya opted to have a saliva sample collection kit shipped directly to her home from [facility]. She will ship the kit back to [facility] for analysis. Turnaround time from date when sample is received at the lab: approximately 3-4 weeks.    Medical Management: For Mya, we reviewed that the information from genetic testing may determine:    additional cancer screening for which Mya may qualify (i.e. mammogram and breast MRI, more frequent colonoscopies, more frequent dermatologic exams, etc.),    options for risk reducing surgeries Mya could consider (i.e. bilateral mastectomy, surgery to  remove her ovaries and/or uterus, etc.),      and targeted chemotherapies if she were to develop certain cancers in the future (i.e. immunotherapy for individuals with Arevalo syndrome, PARP inhibitors, etc.).     These recommendations will be discussed in detail once genetic testing is completed.     Plan:  1) Today Mya elected to proceed with genetic testing via the CancerNext Panel offered by [facility].  2) This information should be available in 4-5 weeks.  3) Mya will be scheduled for a virtual visit (phone or video) to discuss the results.    Ana Mike MS, [facility]  Licensed, Certified Genetic Counselor  Office: [phone]  Email: [email or web address]

## 2022-09-13 NOTE — PATIENT INSTRUCTIONS
Assessing Cancer Risk  Only about 5-10% of cancers are thought to be due to an inherited cancer susceptibility gene.    These families often have:  Several people with the same or related types of cancer  Cancers diagnosed at a young age (before age 50)  Individuals with more than one primary cancer  Multiple generations of the family affected with cancer    Some people may be candidates for genetic testing of more than one gene.  For these families, genetic testing using a cancer panel may be offered.  These panels will test different genes known to increase the risk for breast, ovarian, uterine, and/or other cancers. All of the genes discussed below have published clinical management guidelines for individuals who are found to carry a mutation. The purpose of this handout is to serve as a brief summary of the genes analyzed by the panels used to inquire about hereditary breast and gynecologic cancer:  PADMA, BRCA1, BRCA2, BRIP1, CDH1, CHEK2, MLH1, MSH2, MSH6, PMS2, EPCAM, PTEN, PALB2, RAD51C, RAD51D, and TP53.  ______________________________________________________________________________  Hereditary Breast and Ovarian Cancer Syndrome   (BRCA1 and BRCA2)  A single mutation in one of the copies of BRCA1 or BRCA2 increases the risk for breast and ovarian cancer, among others.  The risk for pancreatic cancer and melanoma may also be slightly increased in some families.  The chart below shows the chance that someone with a BRCA mutation would develop cancer in his or her lifetime1,2,3,4.        A person s ethnic background is also important to consider, as individuals of Ashkenazi Scientologist ancestry have a higher chance of having a BRCA gene mutation.  There are three BRCA mutations that occur more frequently in this population.    Arevalo Syndrome   (MLH1, MSH2, MSH6, PMS2, and EPCAM)  Currently five genes are known to cause Arevalo Syndrome: MLH1, MSH2, MSH6, PMS2, and EPCAM.  A single mutation in one of the Arevalo  Syndrome genes increases the risk for colon, endometrial, ovarian, and stomach cancers.  Other cancers that occur less commonly in Arevalo Syndrome include urinary tract, skin, and brain cancers.  The chart below shows the chance that a person with Arevalo syndrome would develop cancer in his or her lifetime5.      *Cancer risk varies depending on Arevalo syndrome gene found    Cowden Syndrome   (PTEN)  Cowden syndrome is a hereditary condition that increases the risk for breast, thyroid, endometrial, colon, and kidney cancer.  Cowden syndrome is caused by a mutation in the PTEN gene.  A single mutation in one of the copies of PTEN causes Cowden syndrome and increases cancer risk.  The chart below shows the chance that someone with a PTEN mutation would develop cancer in their lifetime6,7.  Other benign features seen in some individuals with Cowden syndrome include benign skin lesions (facial papules, keratoses, lipomas), learning disability, autism, thyroid nodules, colon polyps, and larger head size.      *One recent study found breast cancer risk to be increased to 85%    Li-Fraumeni Syndrome   (TP53)  Li-Fraumeni Syndrome (LFS) is a cancer predisposition syndrome caused by a mutation in the TP53 gene. A single mutation in one of the copies of TP53 increases the risk for multiple cancers. Individuals with LFS are at an increased risk for developing cancer at a young age. The lifetime risk for development of a LFS-associated cancer is 50% by age 30 and 90% by age 60.   Core Cancers: Sarcomas, Breast, Brain, Lung, Leukemias/Lymphomas, Adrenocortical carcinomas  Other Cancers: Gastrointestinal, Thyroid, Skin, Genitourinary    Hereditary Diffuse Gastric Cancer   (CDH1)  Currently, one gene is known to cause hereditary diffuse gastric cancer (HDGC): CDH1.  Individuals with HDGC are at increased risk for diffuse gastric cancer and lobular breast cancer. Of people diagnosed with HDGC, 30-50% have a mutation in the CDH1 gene.   This suggests there are likely other genes that may cause HDGC that have not been identified yet.      Lifetime Cancer Risks    General Population HDGC    Diffuse Gastric  <1% ~80%   Breast 12% 39-52%         Additional Genes  PADMA  PADMA is a moderate-risk breast cancer gene. Women who have a mutation in PADMA can have between a 2-4 fold increased risk for breast cancer compared to the general population8. PADMA mutations have also been associated with increased risk for pancreatic cancer, however an estimate of this cancer risk is not well understood9. Individuals who inherit two PADMA mutations have a condition called ataxia-telangiectasia (AT).  This rare autosomal recessive condition affects the nervous system and immune system, and is associated with progressive cerebellar ataxia beginning in childhood.  Individuals with ataxia-telangiectasia often have a weakened immune system and have an increased risk for childhood cancers.    PALB2  Mutations in PALB2 have been shown to increase the risk of breast cancer up to 33-58% in some families; where individuals fall within this risk range is dependent upon family [id number]. PALB2 mutations have also been associated with increased risk for pancreatic cancer, although this risk has not been quantified yet.  Individuals who inherit two PALB2 mutations--one from their mother and one from their father--have a condition called Fanconi Anemia.  This rare autosomal recessive condition is associated with short stature, developmental delay, bone marrow failure, and increased risk for childhood cancers.    CHEK2   CHEK2 is a moderate-risk breast cancer gene.  Women who have a mutation in CHEK2 have around a 2-fold increased risk for breast cancer compared to the general population, and this risk may be higher depending upon family history.11,12,13 Mutations in CHEK2 have also been shown to increase the risk of a number of other cancers, including colon and prostate, however these  cancer risks are currently not well understood.    BRIP1, RAD51C and RAD51D  Mutations in BRIP1, RAD51C, and RAD51D have been shown to increase the risk of ovarian cancer and possibly female breast cancer as well14,15 .       Lifetime Cancer Risk    General Population BRIP1 RAD51C RAD51D   Ovarian 1-2% ~5-8% ~5-9% ~7-15%           Inheritance  All of the cancer syndromes reviewed above are inherited in an autosomal dominant pattern.  This means that if a parent has a mutation, each of his or her children will have a 50% chance of inheriting that same mutation.  Therefore, each child--male or female--would have a 50% chance of being at increased risk for developing cancer.      Image obtained from Genetics Home Reference, 2013     Mutations in some genes can occur de abner, which means that a person s mutation occurred for the first time in them and was not inherited from a parent.  Now that they have the mutation, however, it can be passed on to future generations.    Genetic Testing  Genetic testing involves a blood test and will look at the genetic information in the PADMA, BRCA1, BRCA2, BRIP1, CDH1, CHEK2, MLH1, MSH2, MSH6, PMS2, EPCAM, PTEN, PALB2, RAD51C, RAD51D, and TP53 genes for any harmful mutations that are associated with increased cancer risk.  If possible, it is recommended that the person(s) who has had cancer be tested before other family members.  That person will give us the most useful information about whether or not a specific gene is associated with the cancer in the family.    Results  There are three possible results of genetic testing:  Positive--a harmful mutation was identified in one or more of the genes  Negative--no mutation was identified in any of the genes on this panel  Variant of unknown significance--a variation in one of the genes was identified, but it is unclear how this impacts cancer risk in the family    Advantages and Disadvantages   There are advantages and disadvantages to  genetic testing.    Advantages  May clarify your cancer risk  Can help you make medical decisions  May explain the cancers in your family  May give useful information to your family members (if you share your results)    Disadvantages  Possible negative emotional impact of learning about inherited cancer risk  Uncertainty in interpreting a negative test result in some situations  Possible genetic discrimination concerns (see below)    Genetic Information Nondiscrimination Act (PILO)  PILO is a federal law that protects individuals from health insurance or employment discrimination based on a genetic test result alone.  Although rare, there are currently no legal discrimination protections in terms of life insurance, long term care, or disability insurances.  Visit the [employer] [place] website to learn more.    Reducing Cancer Risk  All of the genes described above have nationally recognized cancer screening guidelines that would be recommended for individuals who test positive.  In addition to increased cancer screening, surgeries may be offered or recommended to reduce cancer risk.  Recommendations are based upon an individual s genetic test result as well as their personal and family history of cancer.    Questions to Think About Regarding Genetic Testing:  What effect will the test result have on me and my relationship with my family members if I have an inherited gene mutation?  If I don t have a gene mutation?  Should I share my test results, and how will my family react to this news, which may also affect them?  Are my children ready to learn new information that may one day affect their own health?    Hereditary Cancer Resources    FORCE: Facing Our Risk of Cancer Empowered facingourrisk.org   Bright Pink bebrightpink.org   Li-Fraumeni Syndrome Association lfsassociation.org   PTEN World PTENworld.Ecometrica   No stomach for cancer, Inc. nostomachforcancer.org   Stomach cancer relief network  Scrnet.org   Collaborative Group of the Americas on Inherited Colorectal Cancer (CGA) cgaicc.com    Cancer Care cancercare.org   American Cancer Society (ACS) cancer.org   National Cancer Dixon (NCI) cancer.gov     Please call us if you have any questions or concerns.   Cancer Risk Management Program [phone] ([phone])  David Glover, MS [facility]  [phone]  Alia Rudolph, MS, [facility] [phone]  Marlena Corbett, MS, [facility]  [phone]  Lina Blevins, MS, [facility]  [phone]  Ana Rashid, MS, [facility]  [phone]  Sarahi Fontenot, MS, [facility] [phone]  Arleen Michelle, MS, [facility] [phone]    References  Rodrgiuez Wong PDP, Tosha S, Afshan MCKEON, Nahomi JE, Dusty JL, Ruben N, Juan H, Nancy O, Isadora A, Jennifer B, Dao P, Manjosh S, Jenaro DM, Mejia N, Brian E, Melvin H, Liriano E, Lubinski J, Gronwald J, Hien B, Tu H, Thorlacius S, Eerola H, Rodrigo H, Kavin K, Gonsalo OP. Average risks of breast and ovarian cancer associated with BRCA1 or BRCA2 mutations detected in case series unselected for family history: a combined analysis of 222 studies. Am J Hum Myah. 2003;72:1117-30.  Angelo N, Kaylyn M, Nicole G.  BRCA1 and BRCA2 Hereditary Breast and Ovarian Cancer. Gene Reviews online. 2013.  Kit YC, Deepali S, Darío G, Ken S. Breast cancer risk among male BRCA1 and BRCA2 mutation carriers. J Natl Cancer Inst. 2007;99:1811-4.  Alex DANIELLE, Chelly I, Phong J, Nic E, Marcus ER, Ranjith F. Risk of breast cancer in male BRCA2 carriers. J Med Myah. 2010;47:710-1.  National Comprehensive Cancer Network. Clinical practice guidelines in oncology, colorectal cancer screening. Available online (registration required). 2015.  Santos HEMPHILL, Anna J, Helga J, Shazia LA, Meng MS, Eng C. Lifetime cancer risks in individuals with germline PTEN mutations. Clin Cancer Res. 2012;18:400-7.  Mauricio RUIZ. Cowden Syndrome: A Critical Review of the Clinical Literature. J Myah .  2009:18:13-27.  Janki A, Jesus D, Stephen S, Jaqueline P, Hernando T, Clark M, Nick B, Thomas H, Kj R, Sebastian K, Michelle L, Alex DG, Jenaro D, Josh DF, Clari MR, The Breast Cancer Susceptibility Collaboration () & Rachel POE. PADMA mutations that cause ataxia-telangiectasia are breast cancer susceptibility alleles. Nature Genetics. 2006;38:873-875  Abiodun N , Christine Y, Juanita J, Ana L, Radha GM , Shay ML, Gallinger S, Hurst AG, Syngal S, Kamila ML, Domi J , Harley R, Pierce SZ, Jose JR, Julian VE, Carole M, Vopavel B, Merry N, Bhupinder RH, Madhu KW, and Mian AP. PADMA mutations in patients with hereditary pancreatic cancer. Cancer Discover. 2012;2:41-46  Virgil MENDIETA., et al. Breast-Cancer Risk in Families with Mutations in PALB2. NEJM. 2014; 371(6):497-506.  CHEK2 Breast Cancer Case-Control Consortium. CHEK2*1100delC and susceptibility to breast cancer: A collaborative analysis involving 10,860 breast cancer cases and 9,065 controls from 10 studies. Am J Hum Myah, 74 (2004), pp. 6655-8520  Baudilio T, Wilfrido S, Sarah K, et al. Spectrum of Mutations in BRCA1, BRCA2, CHEK2, and TP53 in Families at High Risk of Breast Cancer. EILEEN. 2006;295(12):1132-3764.   Ghulam C, Holly D, Dereck A, et al. Risk of breast cancer in women with a CHEK2 mutation with and without a family history of breast cancer. J Clin Oncol. 2011;29:1683-5681.  Baldomero H, Jose Manuel E, Varsha SJ, et al. Contribution of germline mutations in the RAD51B, RAD51C, and RAD51D genes to ovarian cancer in the population. J Clin Oncol. 2015;33(26):2491-5580. Doi:10.1200/JCO.2015.61.2408.  Lucas Yateson DF, Arianna P, et al. Mutations in BRIP1 confer high risk of ovarian cancer. Azeb Myah. 2011;43(11):9702-0787. doi:10.1038/ng.955.

## 2022-09-26 ENCOUNTER — TRANSFERRED RECORDS (OUTPATIENT)
Dept: HEALTH INFORMATION MANAGEMENT | Facility: CLINIC | Age: 47
End: 2022-09-26

## 2022-10-01 ENCOUNTER — HEALTH MAINTENANCE LETTER (OUTPATIENT)
Age: 47
End: 2022-10-01

## 2023-03-28 ENCOUNTER — E-VISIT (OUTPATIENT)
Dept: URGENT CARE | Facility: CLINIC | Age: 48
End: 2023-03-28
Payer: COMMERCIAL

## 2023-03-28 DIAGNOSIS — J01.90 ACUTE SINUSITIS WITH SYMPTOMS > 10 DAYS: Primary | ICD-10-CM

## 2023-03-28 PROCEDURE — 99421 OL DIG E/M SVC 5-10 MIN: CPT | Performed by: EMERGENCY MEDICINE

## 2023-03-28 NOTE — PATIENT INSTRUCTIONS
Sinusitis (Antibiotic Treatment)    The sinuses are air-filled spaces within the bones of the face. They connect to the inside of the nose. Sinusitis is an inflammation of the tissue that lines the sinuses. Sinusitis can occur during a cold. It can also happen due to allergies to pollens and other particles in the air. Sinusitis can cause symptoms of sinus congestion and a feeling of fullness. A sinus infection causes fever, headache, and facial pain. There is often green or yellow fluid draining from the nose or into the back of the throat (post-nasal drip). You have been given antibiotics to treat this condition.   Home care    Take the full course of antibiotics as instructed. Don't stop taking them, even when you feel better.    Drink plenty of water, hot tea, and other liquids as directed by the healthcare provider. This may help thin nasal mucus. It also may help your sinuses drain fluids.    Heat may help soothe painful areas of your face. Use a towel soaked in hot water. Or,  the shower and direct the warm spray onto your face. Using a vaporizer along with a menthol rub at night may also help soothe symptoms.     An expectorant with guaifenesin may help thin nasal mucus and help your sinuses drain fluids. Talk with your provider or pharmacists before taking an over-the-counter (OTC) medicine if you have any questions about it or its side effects..    You can use an OTC decongestant, unless a similar medicine was prescribed to you. Nasal sprays work the fastest. Use one that contains phenylephrine or oxymetazoline. First blow your nose gently. Then use the spray. Don't use these medicines more often than directed on the label. If you do, your symptoms may get worse. You may also take pills that contain pseudoephedrine. Don t use products that combine multiple medicines. This is because side effects may be increased. Read labels. You can also ask the pharmacist for help. (People with high blood  pressure should not use decongestants. They can raise blood pressure.) Talk with your provider or pharmacist if you have any questions about the medicine..    OTC antihistamines may help if allergies contributed to your sinusitis. Talk with your provider or pharmacist if you have any questions about the medicine.    Nasal rinses or irrigation may help symptoms. It's very important to use these products only as directed. Use sterile water or sterile saline solution and not tap water. Tap water may contain germs that can cause infection in the brain. Don't rinse with excess pressure. this may spread the infection to other areas in your sinuses or head. Ask your healthcare provider or pharmacist if you have questions about using these products.    Use acetaminophen, naproxen, or ibuprofen to control pain, unless another pain medicine was prescribed to you. Talk with your healthcare provider before using these medicines if you have chronic liver or kidney disease or ever had a stomach ulcer. Never give aspirin to anyone under age 18 without first talking to their healthcare provider. It may cause severe liver damage.    Don't smoke. This can make symptoms worse.    Follow-up care  Follow up with your healthcare provider as advised.   When to seek medical advice  Call your healthcare provider if any of these occur:     Facial pain or headache that gets worse    Symptoms don't go away in 10 days    Fever of 100.4 F (38 C) or higher, or as directed by your healthcare provider  Call 911  Call 911 if any of these occur:     Seizure    Trouble breathing    Feeling dizzy or faint    Fingernails, skin, or lips look blue, purple, or gray    Severe headache that doesn't go away    Stiff neck    Unusual drowsiness or confusion    Vision problems such as blurred or double vision    Swelling of your forehead or eyelids  Prevention  Here are steps you can take to help prevent an infection:     Keep good hand washing habits.    Don t  have close contact with people who have sore throats, colds, or other upper respiratory infections.    Don t smoke, and stay away from secondhand smoke.    Stay up to date with all of your vaccines.  [employer] last reviewed this educational content on 1/1/2022 2000-2022 [employer]. All rights reserved. This information is not intended as a substitute for professional medical care. Always follow your healthcare professional's instructions.        Dear Mya Isbell          Based on your responses and diagnosis, I have prescribed Augmentin to treat your symptoms. I have sent this to your pharmacy.?     It is also important to stay well hydrated, get lots of rest and take over-the-counter decongestants,?tylenol?or ibuprofen if you?are able to?take those medications per your primary care provider to help relieve discomfort.?     It is important that you take?all of?your prescribed medication even if your symptoms are improving after a few doses.? Taking?all of?your medicine helps prevent the symptoms from returning.?     If your symptoms worsen, you develop severe headache, vomiting, high fever (>102), or are not improving in 7 days, please contact your primary care provider for an appointment or visit any of our convenient Walk-in Care or Urgent Care Centers to be seen which can be found on our website?here.?     Thanks again for choosing?us?as your health care partner,?   ?  Uli Albrecht MD?

## 2023-04-11 ENCOUNTER — TRANSFERRED RECORDS (OUTPATIENT)
Dept: HEALTH INFORMATION MANAGEMENT | Facility: CLINIC | Age: 48
End: 2023-04-11
Payer: COMMERCIAL

## 2023-06-04 ENCOUNTER — HEALTH MAINTENANCE LETTER (OUTPATIENT)
Age: 48
End: 2023-06-04

## 2023-06-09 ENCOUNTER — ANCILLARY PROCEDURE (OUTPATIENT)
Dept: MAMMOGRAPHY | Facility: CLINIC | Age: 48
End: 2023-06-09
Attending: OBSTETRICS & GYNECOLOGY
Payer: COMMERCIAL

## 2023-06-09 DIAGNOSIS — Z12.31 VISIT FOR SCREENING MAMMOGRAM: ICD-10-CM

## 2023-06-09 PROCEDURE — 77067 SCR MAMMO BI INCL CAD: CPT | Mod: TC | Performed by: RADIOLOGY

## 2023-08-28 ENCOUNTER — VIRTUAL VISIT (OUTPATIENT)
Dept: ONCOLOGY | Facility: CLINIC | Age: 48
End: 2023-08-28
Attending: GENETIC COUNSELOR, MS
Payer: COMMERCIAL

## 2023-08-28 DIAGNOSIS — Z80.3 FAMILY HISTORY OF MALIGNANT NEOPLASM OF BREAST: ICD-10-CM

## 2023-08-28 DIAGNOSIS — Z80.42 FAMILY HISTORY OF PROSTATE CANCER: ICD-10-CM

## 2023-08-28 DIAGNOSIS — Z84.81 FHX: BRCA GENE POSITIVE: Primary | ICD-10-CM

## 2023-08-28 PROCEDURE — 999N000069 HC STATISTIC GENETIC COUNSELING, < 16 MIN: Mod: GT,95 | Performed by: GENETIC COUNSELOR, MS

## 2023-08-28 NOTE — LETTER
"    8/28/2023         RE: Mya Isbell  [address] [place] [address]        Dear Colleague,    Thank you for referring your patient, Mya Isbell, to the [facility]. Please see a copy of my visit note below.    Virtual Visit Details    Type of service:  Video Visit     Originating Location (pt. Location): Home  Distant Location (provider location):  Off-site  Platform used for Video Visit: [employer]   Time spent over video: 4 minutes    8/28/2023    Referring Provider: Walter Mejía MD    Presenting Information:  I spoke to Mya over video today to discuss her genetic testing results. Testing was performed on a saliva sample collected by Mya at her home. The CancerNext Panel was ordered from [employer]. This testing was done because of Mya's family history of cancer and a familial BRCA2 mutation.    Genetic Testing Result: NEGATIVE  Mya is negative for mutations in the 36 genes analyzed: APC, PADMA, BARD1, BMPR1A, BRCA1, BRCA2, BRIP1, CDH1, CDK4, CDKN2A, CHEK2, DICER1, MLH1, MSH2, MSH6, MUTYH, NBN, NF1, NTHL1, PALB2, PMS2, PTEN, RAD51C, RAD51D, RECQL, SMAD4, SMARCA4, STK11 and TP53 (sequencing and deletion/duplication); AXIN2, HOXB13, MSH3, POLD1 and POLE (sequencing only); EPCAM and GREM1 (deletion/duplication only).     Of note, the BRCA2 mutation that was previously identified in her paternal uncle was not detected in Mya.     A copy of the test report can be found in the Laboratory tab, dated 7/23/23, and named \"LABORATORY MISCELLANEOUS ORDER\". The report is scanned in as a linked document.    Interpretation:  We discussed several different interpretations of this negative test result.    One explanation may be that there is a different gene or combination of genes and environment that are associated with the cancers in this family.  It is possible that her relatives have a mutation in one of these genes and she did not " inherit it.  There is also a small possibility that there is a mutation in one of these genes, and the testing laboratory could not find it with their current testing methods.       Screening:  Based on this negative test result, it is important for Mya and her relatives to refer back to the family history for appropriate cancer screening.    Based on the personal and family history information she provided, Mya does not meet current National Comprehensive Cancer Network (NCCN) guidelines for high risk breast screening based on the LOIS Risk Evaluator v8 model. As such, Mya should continue with her routine breast imaging. In addition, Mya should be receiving clinical breast exams by her physician.    Other population cancer screening options, such as those recommended by the American Cancer Society and the National Comprehensive Cancer Network (NCCN), are also appropriate for Mya and her family. These screening recommendations may change if there are changes to Mya's personal and/or family history of cancer. Final screening recommendations should be made by each individual's primary care provider.       Inheritance:  We reviewed the autosomal dominant inheritance of mutations in these genes. Mutations in these genes do not skip generations.      Additional Testing Considerations:  Although Mya's genetic testing result was negative, other relatives may still carry a gene mutation associated with an increased risk for cancer. Genetic counseling is recommended for her siblings, father, and paternal relatives to discuss genetic testing options. If any of these relatives do pursue genetic testing, Mya is encouraged to contact me so that we may review the impact of their test results on her.    Summary:  While no genetic changes were identified, Mya may still be at risk for certain cancers due to family history, environmental factors, or other genetic causes not  identified by this test. Because of that, it is important that she continue with cancer screening based on her personal and family history as discussed above.    Genetic testing is rapidly advancing, and new cancer susceptibility genes will most likely be identified in the future. Therefore, I encouraged Mya to contact me annually or if there are changes in her personal or family history. This may change how we assess her cancer risk, screening, and the testing we would offer.    Plan:  1. A copy of her results was released to her today via the online [employer] portal.   2. She plans to follow-up with her physicians.  3. She should contact me regularly, or sooner if her family history changes.    If Mya has any further questions, I encouraged her to contact me at [phone].    Ana Mike MS, [facility] [place]  Licensed, Certified Genetic Counselor  Office: [phone]  Email: [email or web address]

## 2023-08-28 NOTE — PROGRESS NOTES
"Virtual Visit Details    Type of service:  Video Visit     Originating Location (pt. Location): Home  Distant Location (provider location):  Off-site  Platform used for Video Visit: [employer]   Time spent over video: 4 minutes    8/28/2023    Referring Provider: Walter Mejía MD    Presenting Information:  I spoke to Mya over video today to discuss her genetic testing results. Testing was performed on a saliva sample collected by Mya at her home. The [facility] Panel was ordered from [facility]. This testing was done because of Mya's family history of cancer and a familial BRCA2 mutation.    Genetic Testing Result: NEGATIVE  Mya is negative for mutations in the 36 genes analyzed: APC, PADMA, BARD1, BMPR1A, BRCA1, BRCA2, BRIP1, CDH1, CDK4, CDKN2A, CHEK2, DICER1, MLH1, MSH2, MSH6, MUTYH, NBN, NF1, NTHL1, PALB2, PMS2, PTEN, RAD51C, RAD51D, RECQL, SMAD4, SMARCA4, STK11 and TP53 (sequencing and deletion/duplication); AXIN2, HOXB13, MSH3, POLD1 and POLE (sequencing only); EPCAM and GREM1 (deletion/duplication only).     Of note, the BRCA2 mutation that was previously identified in her paternal uncle was not detected in Mya.     A copy of the test report can be found in the Laboratory tab, dated 7/23/23, and named \"LABORATORY MISCELLANEOUS ORDER\". The report is scanned in as a linked document.    Interpretation:  We discussed several different interpretations of this negative test result.    1. One explanation may be that there is a different gene or combination of genes and environment that are associated with the cancers in this family.  2. It is possible that her relatives have a mutation in one of these genes and she did not inherit it.  3. There is also a small possibility that there is a mutation in one of these genes, and the testing laboratory could not find it with their current testing methods.       Screening:  Based on this negative test result, it is important for Mya " and her relatives to refer back to the family history for appropriate cancer screening.      Based on the personal and family history information she provided, Mya does not meet current National Comprehensive Cancer Network (NCCN) guidelines for high risk breast screening based on the LOIS Risk Evaluator v8 model. As such, Mya should continue with her routine breast imaging. In addition, Mya should be receiving clinical breast exams by her physician.      Other population cancer screening options, such as those recommended by the American Cancer Society and the National Comprehensive Cancer Network (NCCN), are also appropriate for Mya and her family. These screening recommendations may change if there are changes to Mya's personal and/or family history of cancer. Final screening recommendations should be made by each individual's primary care provider.       Inheritance:  We reviewed the autosomal dominant inheritance of mutations in these genes. Mutations in these genes do not skip generations.      Additional Testing Considerations:  Although Mya's genetic testing result was negative, other relatives may still carry a gene mutation associated with an increased risk for cancer. Genetic counseling is recommended for her siblings, father, and paternal relatives to discuss genetic testing options. If any of these relatives do pursue genetic testing, yMa is encouraged to contact me so that we may review the impact of their test results on her.    Summary:  While no genetic changes were identified, Mya may still be at risk for certain cancers due to family history, environmental factors, or other genetic causes not identified by this test. Because of that, it is important that she continue with cancer screening based on her personal and family history as discussed above.    Genetic testing is rapidly advancing, and new cancer susceptibility genes will most likely be  identified in the future. Therefore, I encouraged Mya to contact me annually or if there are changes in her personal or family history. This may change how we assess her cancer risk, screening, and the testing we would offer.    Plan:  1. A copy of her results was released to her today via the online [employer] portal.   2. She plans to follow-up with her physicians.  3. She should contact me regularly, or sooner if her family history changes.    If Mya has any further questions, I encouraged her to contact me at [phone].    Ana Mike MS, [facility] [place]  Licensed, Certified Genetic Counselor  Office: [phone]  Email: [email or web address]

## 2023-08-28 NOTE — NURSING NOTE
Is the patient currently in the state of [place]? YES    Visit mode:VIDEO    If the visit is dropped, the patient can be reconnected by: VIDEO VISIT: Text to cell phone:   Telephone Information:   Mobile [phone]       Will anyone else be joining the visit? NO  (If patient encounters technical issues they should call [phone]    How would you like to obtain your AVS? MyChart    Are changes needed to the allergy or medication list? No    Reason for visit: RECHECK    MARIBETH COLON

## 2024-07-21 ENCOUNTER — HEALTH MAINTENANCE LETTER (OUTPATIENT)
Age: 49
End: 2024-07-21

## 2024-08-22 ENCOUNTER — ANCILLARY PROCEDURE (OUTPATIENT)
Dept: MAMMOGRAPHY | Facility: CLINIC | Age: 49
End: 2024-08-22
Attending: OBSTETRICS & GYNECOLOGY
Payer: COMMERCIAL

## 2024-08-22 DIAGNOSIS — Z12.31 VISIT FOR SCREENING MAMMOGRAM: ICD-10-CM

## 2024-08-22 PROCEDURE — 77063 BREAST TOMOSYNTHESIS BI: CPT | Mod: TC | Performed by: RADIOLOGY

## 2024-08-22 PROCEDURE — 77067 SCR MAMMO BI INCL CAD: CPT | Mod: TC | Performed by: RADIOLOGY

## 2024-09-19 ENCOUNTER — LAB REQUISITION (OUTPATIENT)
Dept: LAB | Facility: CLINIC | Age: 49
End: 2024-09-19

## 2024-09-19 DIAGNOSIS — Z13.1 ENCOUNTER FOR SCREENING FOR DIABETES MELLITUS: ICD-10-CM

## 2024-09-19 DIAGNOSIS — Z13.220 ENCOUNTER FOR SCREENING FOR LIPOID DISORDERS: ICD-10-CM

## 2024-09-19 DIAGNOSIS — E55.9 VITAMIN D DEFICIENCY, UNSPECIFIED: ICD-10-CM

## 2024-09-19 DIAGNOSIS — E53.8 DEFICIENCY OF OTHER SPECIFIED B GROUP VITAMINS: ICD-10-CM

## 2024-09-19 DIAGNOSIS — Z13.29 ENCOUNTER FOR SCREENING FOR OTHER SUSPECTED ENDOCRINE DISORDER: ICD-10-CM

## 2024-09-19 LAB
CHOLEST SERPL-MCNC: 132 MG/DL
EST. AVERAGE GLUCOSE BLD GHB EST-MCNC: 82 MG/DL
FASTING STATUS PATIENT QL REPORTED: ABNORMAL
HBA1C MFR BLD: 4.5 %
HDLC SERPL-MCNC: 39 MG/DL
LDLC SERPL CALC-MCNC: 81 MG/DL
NONHDLC SERPL-MCNC: 93 MG/DL
TRIGL SERPL-MCNC: 59 MG/DL
TSH SERPL DL<=0.005 MIU/L-ACNC: 1.19 UIU/ML (ref 0.3–4.2)
VIT B12 SERPL-MCNC: 190 PG/ML (ref 232–1245)
VIT D+METAB SERPL-MCNC: 35 NG/ML (ref 20–50)

## 2024-09-19 PROCEDURE — 84443 ASSAY THYROID STIM HORMONE: CPT | Performed by: OBSTETRICS & GYNECOLOGY

## 2024-09-19 PROCEDURE — 82306 VITAMIN D 25 HYDROXY: CPT | Performed by: OBSTETRICS & GYNECOLOGY

## 2024-09-19 PROCEDURE — 82607 VITAMIN B-12: CPT | Performed by: OBSTETRICS & GYNECOLOGY

## 2024-09-19 PROCEDURE — 80061 LIPID PANEL: CPT | Performed by: OBSTETRICS & GYNECOLOGY

## 2024-09-19 PROCEDURE — 83036 HEMOGLOBIN GLYCOSYLATED A1C: CPT | Performed by: OBSTETRICS & GYNECOLOGY

## 2024-10-24 ENCOUNTER — LAB REQUISITION (OUTPATIENT)
Dept: LAB | Facility: CLINIC | Age: 49
End: 2024-10-24

## 2024-10-24 DIAGNOSIS — N39.0 URINARY TRACT INFECTION, SITE NOT SPECIFIED: ICD-10-CM

## 2024-10-24 PROCEDURE — 87186 SC STD MICRODIL/AGAR DIL: CPT | Performed by: OBSTETRICS & GYNECOLOGY

## 2024-10-26 LAB — BACTERIA UR CULT: ABNORMAL

## 2025-02-12 ENCOUNTER — LAB REQUISITION (OUTPATIENT)
Dept: LAB | Facility: CLINIC | Age: 50
End: 2025-02-12

## 2025-02-12 DIAGNOSIS — N95.1 MENOPAUSAL AND FEMALE CLIMACTERIC STATES: ICD-10-CM

## 2025-02-12 DIAGNOSIS — E53.8 DEFICIENCY OF OTHER SPECIFIED B GROUP VITAMINS: ICD-10-CM

## 2025-02-12 DIAGNOSIS — E55.9 VITAMIN D DEFICIENCY, UNSPECIFIED: ICD-10-CM

## 2025-02-12 PROCEDURE — 82306 VITAMIN D 25 HYDROXY: CPT | Performed by: OBSTETRICS & GYNECOLOGY

## 2025-02-12 PROCEDURE — 84443 ASSAY THYROID STIM HORMONE: CPT | Performed by: OBSTETRICS & GYNECOLOGY

## 2025-02-12 PROCEDURE — 83001 ASSAY OF GONADOTROPIN (FSH): CPT | Performed by: OBSTETRICS & GYNECOLOGY

## 2025-02-12 PROCEDURE — 82607 VITAMIN B-12: CPT | Performed by: OBSTETRICS & GYNECOLOGY

## 2025-02-13 LAB
FSH SERPL IRP2-ACNC: 10.3 MIU/ML
TSH SERPL DL<=0.005 MIU/L-ACNC: 1.31 UIU/ML (ref 0.3–4.2)
VIT B12 SERPL-MCNC: 257 PG/ML (ref 232–1245)
VIT D+METAB SERPL-MCNC: 34 NG/ML (ref 20–50)